# Patient Record
Sex: MALE | Race: BLACK OR AFRICAN AMERICAN | Employment: OTHER | URBAN - METROPOLITAN AREA
[De-identification: names, ages, dates, MRNs, and addresses within clinical notes are randomized per-mention and may not be internally consistent; named-entity substitution may affect disease eponyms.]

---

## 2017-03-13 ENCOUNTER — CLINICAL SUPPORT (OUTPATIENT)
Dept: CARDIOLOGY CLINIC | Age: 77
End: 2017-03-13

## 2017-03-13 DIAGNOSIS — Z95.810 ICD (IMPLANTABLE CARDIOVERTER-DEFIBRILLATOR), DUAL, IN SITU: Primary | ICD-10-CM

## 2017-03-13 NOTE — PROGRESS NOTES
Last Office Visit date : 11/21/16  Next Office visit date : 11/6/17  Last remote check date : 12/12/16  scanned in our chart : yes

## 2017-03-13 NOTE — PATIENT INSTRUCTIONS
please get remote checks for pacer or ICD every 3 months and Office check in 1 yr  Please call our office after every transmission to confirm success of transmission

## 2017-04-04 ENCOUNTER — OFFICE VISIT (OUTPATIENT)
Dept: VASCULAR SURGERY | Age: 77
End: 2017-04-04

## 2017-04-04 DIAGNOSIS — I70.202 FEMORAL ARTERY OCCLUSION, LEFT (HCC): ICD-10-CM

## 2017-04-04 DIAGNOSIS — I70.212 ATHEROSCLEROSIS OF NATIVE ARTERY OF LEFT LOWER EXTREMITY WITH INTERMITTENT CLAUDICATION (HCC): ICD-10-CM

## 2017-04-04 NOTE — PROCEDURES
[facility]   *** FINAL REPORT ***    Name: Feliz Corrales  MRN: [id number]       Outpatient  : 15 Aug 1940  HIS Order #: [id number]  [id number] [facility] Visit #: [id number]  Date: 2017    TYPE OF TEST: Extremity Arterial Duplex    REASON FOR TEST  Peripheral vascular dz NOS, Follow up revascularization                            Right                     Left  Artery               PSV   Finding             PSV   Finding  ------------------  -----  ---------------    -----  ---------------  External iliac:  Common femoral:                               134.0  Profunda femoris:                             311.0  >50% stenosis  Proximal SFA:                            282.0  Mid SFA:                                 163.0  Mild stenosis  Distal SFA:                              145.0  Popliteal:                                     81.0  Anterior tibial:  Posterior tibial:    Pressures               Right     Left               -----     -----     Brachial:   178       181           DP:   170       174           PT:   169       168            JHONATAN:  0.94      0.96            Toe: INTERPRETATION/FINDINGS  Duplex images were obtained using 2-D gray scale, color flow and  spectral doppler analysis. Left leg :  1. Diffuse atherosclerotic plaquing in the comon femoral, superficial  femoral and popliteal arteries. <50% stenosis by criteria. 2. Diffuse plaquing in the origin of the Profunda artery with elevated   velocities suggestive of >50% stenosis. 3. Multiphasic doppler signals noted throughout. 4. Mild arterial disease on the right and normal perfusion s/p revasc  on the left. The right ankle/brachial index is 0.94 and the left  ankle/brachial index is 0.96. Compared to the prior exam, there is an increase in the PSV at the  proximal to mid SFA level and in the PFA. ADDITIONAL COMMENTS    I have personally reviewed the data relevant to the interpretation of  this  study.     TECHNOLOGIST: Eren Khan, MOHINI  Signed: 04/04/2017 10:05 AM    PHYSICIAN: Karel Luque D.O.   Signed: 04/04/2017 01:10 PM

## 2017-04-04 NOTE — PROCEDURES
Tim Diamond   *** FINAL REPORT ***    Name: Des Ruiz  MRN: [id number]       Outpatient  : 15 Aug 1940  HIS Order #: [id number]  [id number] [facility] Visit #: [id number]  Date: 2017    TYPE OF TEST: Peripheral Arterial Testing    REASON FOR TEST  Peripheral vascular dz NOS, Follow-up limb revasc    Right Leg  Segmentals: Abnormal                     mmHg  Brachial         178  High thigh  Low thigh  Calf  Posterior tibial 169  Dorsalis pedis   170  Peroneal  Metatarsal  Toe pressure  Doppler:    Normal  Ankle/Brachial: 0.94    Left Leg  Segmentals: Normal                     mmHg  Brachial         181  High thigh  Low thigh  Calf  Posterior tibial 168  Dorsalis pedis   174  Peroneal  Metatarsal  Toe pressure  Doppler:    Normal  Ankle/Brachial: 0.96    INTERPRETATION/FINDINGS  Physiologic testing was performed using continuous wave doppler and  segmental pressures. JHONATAN only:  1. Mild peripheral arterial disease indicated at rest in the right  leg. 2. No evidence of significant peripheral arterial disease at rest in  the left leg. 3. The right ankle/brachial index is 0.94 and the left ankle/brachial  index is 0.96. Compared to 16 exam, there is a slight change in JHONATAN from 0.95 to   0.94 on the right, which places disease in mild range, left unchanged   at rest.    ADDITIONAL COMMENTS    I have personally reviewed the data relevant to the interpretation of  this  study. TECHNOLOGIST: Mary Colmenares RDMS  Signed: 2017 09:55 AM    PHYSICIAN: LONI Marte   Signed: 2017 01:09 PM

## 2017-04-27 ENCOUNTER — OFFICE VISIT (OUTPATIENT)
Dept: VASCULAR SURGERY | Age: 77
End: 2017-04-27

## 2017-04-27 VITALS
BODY MASS INDEX: 25.05 KG/M2 | HEART RATE: 60 BPM | SYSTOLIC BLOOD PRESSURE: 140 MMHG | HEIGHT: 70 IN | RESPIRATION RATE: 16 BRPM | DIASTOLIC BLOOD PRESSURE: 72 MMHG | WEIGHT: 175 LBS

## 2017-04-27 DIAGNOSIS — I70.213 ATHEROSCLEROSIS OF NATIVE ARTERY OF BOTH LOWER EXTREMITIES WITH INTERMITTENT CLAUDICATION (HCC): Primary | ICD-10-CM

## 2017-04-27 DIAGNOSIS — I70.202 FEMORAL ARTERY STENOSIS, LEFT (HCC): ICD-10-CM

## 2017-04-27 NOTE — MR AVS SNAPSHOT
Visit Information Date & Time Provider Department Dept. Phone Encounter #  
 4/27/2017 10:15 AM Izaiah Weaver, [address] [facility] [id number] Follow-up Instructions Return in about 1 year (around 4/27/2018). Your Appointments 11/6/2017  9:30 AM  
PROCEDURE with Miguel Avilez MD  
[facility]) Appt Note: 1 yr with pacer ck post labs [address] [facility], [address] [place] [id number]  
[facility], [address] [place] [address] Road [facility]  
  
    
 4/27/2018 10:00 AM  
PROCEDURE with [facility] NONIMAGING [facility] ([facility] Appt Note: LEG ART WENDY W/TM [address], [place] [address] [address] [facility] [address]  
[phone] [address]  
  
    
 5/10/2018  9:45 AM  
Follow Up with [facility] ([facility]) Appt Note: 1 YEAR FU AFTER STUDY AT OUR LAB  
 [address] [facility] [address]  
[phone] 2300 [facility] Kishanmarcella RussellTam [address] [facility] [address] Upcoming Health Maintenance Date Due  
 FOOT EXAM Q1 8/15/1950 EYE EXAM RETINAL OR DILATED Q1 8/15/1950 DTaP/Tdap/Td series (1 - Tdap) 8/15/1961 ZOSTER VACCINE AGE 60> 8/15/2000 GLAUCOMA SCREENING Q2Y 8/15/2005 Pneumococcal 65+ Low/Medium Risk (1 of 2 - PCV13) 8/15/2005 MEDICARE YEARLY EXAM 8/15/2005 HEMOGLOBIN A1C Q6M 6/4/2015 MICROALBUMIN Q1 12/4/2015 INFLUENZA AGE 9 TO ADULT 8/1/2016 LIPID PANEL Q1 11/29/2017 Allergies as of 4/27/2017  Review Complete On: 4/27/2017 By: Ting Blackwood LPN Severity Noted Reaction Type Reactions Fish Oil [Docosahexanoic Acid-epa]  02/04/2014    Rash, Itching Current Immunizations  Never Reviewed No immunizations on file. Not reviewed this visit You Were Diagnosed With   
  
 Codes Comments Atherosclerosis of native artery of both lower extremities with intermittent claudication ([facility]    -  Primary ICD-10-CM: T32.981 ICD-9-CM: 440.21 Vitals BP Pulse Resp Height(growth percentile) Weight(growth percentile) BMI  
 140/72 (BP 1 Location: Left arm, BP Patient Position: Sitting) 60 16 5' 10\" (1.778 m) 175 lb (79.4 kg) 25.11 kg/m2 Smoking Status Former Smoker Vitals History BMI and BSA Data Body Mass Index Body Surface Area  
 25.11 kg/m 2 1.98 m 2 Preferred Pharmacy Pharmacy Name Phone [address], [facility] [phone] Your Updated Medication List  
  
   
This list is accurate as of: 4/27/17 11:00 AM.  Always use your most recent med list.  
  
  
  
  
 ACTOS 30 mg tablet Generic drug:  pioglitazone Take 30 mg by mouth daily. aspirin delayed-release 81 mg tablet Take 1 Tab by mouth daily. atorvastatin 20 mg tablet Commonly known as:  LIPITOR Take 20 mg by mouth daily. carvedilol 25 mg tablet Commonly known as:  Johnnette  Take 25 mg by mouth two (2) times daily (with meals). clopidogrel 75 mg Tab Commonly known as:  PLAVIX Take 1 Tab by mouth daily. escitalopram oxalate 10 mg tablet Commonly known as:  Maudie Phoenix Take 10 mg by mouth daily as needed. EXFORGE 5-320 mg per tablet Generic drug:  amLODIPine-valsartan Take 1 Tab by mouth daily. glimepiride 2 mg tablet Commonly known as:  AMARYL Take 2 mg by mouth two (2) times a day. HYDROcodone-acetaminophen  mg tablet Commonly known as:  Maryfrances Grumbles Take 1 Tab by mouth every eight (8) hours as needed for Pain. Max Daily Amount: 3 Tabs. JANUVIA 100 mg tablet Generic drug:  SITagliptin Take 100 mg by mouth daily. LUNESTA 3 mg tablet Generic drug:  eszopiclone Take 3 mg by mouth nightly as needed. multivitamin tablet Commonly known as:  ONE A DAY Take 1 Tab by mouth daily. TRAVATAN Z 0.004 % ophthalmic solution Generic drug:  travoprost  
Administer 1 Drop to both eyes every evening. VOLTAREN 1 % Gel Generic drug:  diclofenac  
four (4) times daily as needed. Follow-up Instructions Return in about 1 year (around 4/27/2018). To-Do List   
 04/27/2018 Imaging:  LOWER EXT ART PVR W EXERC BILAT (TREADMILL/WALKING) AMB Please provide this summary of care documentation to your next provider. Your primary care clinician is listed as MARYAM PERRY. If you have any questions after today's visit, please call [phone].

## 2017-04-27 NOTE — PROGRESS NOTES
[facility]    Chief Complaint   Patient presents with    Leg Pain       History and Physical    Mr Cheryl Maradiaga is here for follow up after he had left leg SFA atherectomy and balloon angioplasty nearly a year and a half ago for claudication symptoms of the left leg. Had been primarily described as pain that would occur just with walking, in the left groin but with radiation to the thigh and also in the calf. After his initial procedure he had good resolution of symptoms. We saw him last fall he did start describing some intermittent familiar symptoms. But he felt it was not as severe as it had been prior to his procedure, so we agreed to just do surveillance. He has continued on aspirin and Plavix. He is still aware of the quality of the symptoms, but again no progression to level of severity as before his procedure. Past Medical History:   Diagnosis Date    Aortic valve disorders     2/10 mild AI    Automatic implantable cardiac defibrillator in situ     Needs remote check ASAP    Bone pain     CAD (coronary artery disease) 11/3/09    Cancer ([address]     prostate    DDD (degenerative disc disease), lumbosacral     L5-S1    Diabetes mellitus ([address]     Elevated prostate specific antigen (PSA) 3/10/08    Erectile dysfunction     Essential hypertension     H/O prostate cancer     Hypertension     Hypertrophic cardiomyopathy ([address]     Improved LVH    Impotence of organic origin 3/10/08    Lower urinary tract symptoms (LUTS)     Nicotine addiction 11/3/09    Other and unspecified hyperlipidemia     PAD (peripheral artery disease) ([address] 11/13/2015    add asa pending angio     Prostate cancer [facility])      s/p CRYO May 2011.   TRUS bx showed T1a [place] 4+3, 60% one core    Skin ulcer ([address]     SOBOE (shortness of breath on exertion)     Spinal stenosis     Stomach ulcer     Type II or unspecified type diabetes mellitus without mention of complication, not stated as uncontrolled     Controlled per wife    Unspecified sleep apnea     has CPAP    Wears glasses      Patient Active Problem List   Diagnosis Code    Nicotine addiction F17.200    CAD (coronary artery disease) I25.10    Elevated prostate specific antigen (PSA) R97.20    Impotence of organic origin N52.9    Diabetes mellitus ([facility] E11.9    H/O prostate cancer Z85.46    ED (erectile dysfunction) N52.9    Essential hypertension, benign I10    Hypertrophic cardiomyopathy ([facility] I42.2    Hyperlipemia E78.5    ICD (implantable cardioverter-defibrillator), dual, in situ Z95.810    Aortic valve disorders I35.9    Type II or unspecified type diabetes mellitus without mention of complication, not stated as uncontrolled E11.9    Pre-operative cardiovascular examination Z01.810    Coronary atherosclerosis of native coronary artery I25.10    Spinal stenosis M48.00    DDD (degenerative disc disease), lumbosacral M51.37    Arthritis of right knee M17.11    PAD (peripheral artery disease) ([facility] I73.9    Lumbar spinal stenosis M48.06    Facet arthritis of lumbar region ([facility] M46.96    Chronic pain G89.29     Past Surgical History:   Procedure Laterality Date    HX CARPAL TUNNEL RELEASE      HX ENDOSCOPY      HX IMPLANTABLE CARDIOVERTER DEFIBRILLATOR      HX KNEE REPLACEMENT  8/98    HX KNEE REPLACEMENT Right 08/06/2013    [facility], total knee replacement     HX KNEE REPLACEMENT Left 1998    HX KNEE REPLACEMENT Left 2014    [facility]    HX KNEE REPLACEMENT Right 2014    HX ORTHOPAEDIC      HX PACEMAKER      HX PROSTATECTOMY  6/30/11    HX UROLOGICAL      [facility]. Dr. Sangeeta Pelayo, Cryo     AL COLONOSCOPY FLX DX W/COLLJ [facility] INPATIENT REHABILITATION WHEN PFRMD       Current Outpatient Prescriptions   Medication Sig Dispense Refill    clopidogrel (PLAVIX) 75 mg tablet Take 1 Tab by mouth daily. 90 Tab 4    aspirin delayed-release 81 mg tablet Take 1 Tab by mouth daily.  100 Tab 3    HYDROcodone-acetaminophen (NORCO)  mg tablet Take 1 Tab by mouth every eight (8) hours as needed for Pain. Max Daily Amount: 3 Tabs. 90 Tab 0    VOLTAREN 1 % topical gel four (4) times daily as needed.  escitalopram oxalate (LEXAPRO) 10 mg tablet Take 10 mg by mouth daily as needed.  glimepiride (AMARYL) 2 mg tablet Take 2 mg by mouth two (2) times a day.  pioglitazone (ACTOS) 30 mg tablet Take 30 mg by mouth daily.  amLODIPine-valsartan (EXFORGE) 5-320 mg per tablet Take 1 Tab by mouth daily.  sitaGLIPtin (JANUVIA) 100 mg tablet Take 100 mg by mouth daily.  travoprost (TRAVATAN Z) 0.004 % ophthalmic solution Administer 1 Drop to both eyes every evening.  eszopiclone (LUNESTA) 3 mg tablet Take 3 mg by mouth nightly as needed.  multivitamin (ONE A DAY) tablet Take 1 Tab by mouth daily.  atorvastatin (LIPITOR) 20 mg tablet Take 20 mg by mouth daily.  carvedilol (COREG) 25 mg tablet Take 25 mg by mouth two (2) times daily (with meals). Allergies   Allergen Reactions    Fish Oil [Docosahexanoic Acid-Epa] Rash and Itching       Review of Systems    Review of Systems - History obtained from the patient  General ROS: negative  Psychological ROS: negative  Ophthalmic ROS: positive for - uses glasses  Respiratory ROS: negative  Cardiovascular ROS: negative  Gastrointestinal ROS: negative  Musculoskeletal ROS: knee pain, left groin pain  Neurological ROS: no TIA or stroke symptoms  Dermatological ROS: negative  Vascular ROS: mild left leg claudication    Physical   Visit Vitals    /72 (BP 1 Location: Left arm, BP Patient Position: Sitting)    Pulse 60    Resp 16    Ht 5' 10\" (1.778 m)    Wt 175 lb (79.4 kg)    BMI 25.11 kg/m2     General:  Alert, cooperative, no distress. Head:  Normocephalic, without obvious abnormality, atraumatic. Eyes:     Conjunctivae/corneas clear. Pupils equal, round, reactive to light. Extraocular movements intact. Neck:  No bruits   Lungs:  Clear to auscultation bilaterally.    Heart:  Regular rate and rhythm, S1, S2 normal   Extremities: Extremities normal, atraumatic, no cyanosis or edema. Pulses: Distal pulses nonpalpable but no ischemic changes. Skin: Skin color, texture, turgor normal. No rashes or lesions.          Vascular studies:  Left leg :  1. Diffuse atherosclerotic plaquing in the comon femoral, superficial  femoral and popliteal arteries. <50% stenosis by criteria. 2. Diffuse plaquing in the origin of the Profunda artery with elevated  velocities suggestive of >50% stenosis. 3. Multiphasic doppler signals noted throughout. 4. Mild arterial disease on the right and normal perfusion s/p revasc  on the left. The right ankle/brachial index is 0.94 and the left  ankle/brachial index is 0.96. Compared to the prior exam, there is an increase in the PSV at the  proximal to mid SFA level and in the PFA. Impression/Plan:     ICD-10-CM ICD-9-CM    1. Atherosclerosis of native artery of both lower extremities with intermittent claudication (HCC) I70.213 440.21 LOWER EXT ART PVR W EXERC BILAT (TREADMILL/WALKING) AMB     Orders Placed This Encounter    LOWER EXT ART PVR W EXERC BILAT (TREADMILL/WALKING) AMB     I reviewed and explained his results, as it correlates to his symptoms. He still feels at this time there is not enough severity to warrant having to do any repeat intervention. But he does feel like he could easily recognized the symptom quality that if he felt he needed to come in sooner he would give us a call. He has musculoskeletal issues too, but does feel he can distinguish those from what these vascular complaints were. He is otherwise on good risk factor control. With stability of the studies overall though I said we can just transition to yearly follow-up with him being reliable enough to call with any changes. We did do a carotid last year which was stable with mild disease, which had also been stable from a study in 2010.   We only need repeat this every few years.      Follow-up Disposition:  Return in about 1 year (around 4/27/2018). NAVID Mejía    Portions of this note have been entered using voice recognition software.

## 2017-10-17 PROBLEM — K31.A0 INTESTINAL METAPLASIA OF GASTRIC MUCOSA: Status: ACTIVE | Noted: 2017-10-17

## 2017-11-06 ENCOUNTER — CLINICAL SUPPORT (OUTPATIENT)
Dept: CARDIOLOGY CLINIC | Age: 77
End: 2017-11-06

## 2017-11-06 VITALS
DIASTOLIC BLOOD PRESSURE: 77 MMHG | HEART RATE: 53 BPM | HEIGHT: 71 IN | BODY MASS INDEX: 24.36 KG/M2 | SYSTOLIC BLOOD PRESSURE: 168 MMHG | WEIGHT: 174 LBS

## 2017-11-06 DIAGNOSIS — I10 ESSENTIAL HYPERTENSION, BENIGN: ICD-10-CM

## 2017-11-06 DIAGNOSIS — I35.1 NONRHEUMATIC AORTIC VALVE INSUFFICIENCY: ICD-10-CM

## 2017-11-06 DIAGNOSIS — E78.00 PURE HYPERCHOLESTEROLEMIA: ICD-10-CM

## 2017-11-06 DIAGNOSIS — E11.9 TYPE 2 DIABETES MELLITUS WITHOUT COMPLICATION, WITHOUT LONG-TERM CURRENT USE OF INSULIN (HCC): ICD-10-CM

## 2017-11-06 DIAGNOSIS — I42.2 HYPERTROPHIC CARDIOMYOPATHY (HCC): Primary | ICD-10-CM

## 2017-11-06 DIAGNOSIS — I25.10 ATHEROSCLEROSIS OF NATIVE CORONARY ARTERY OF NATIVE HEART WITHOUT ANGINA PECTORIS: ICD-10-CM

## 2017-11-06 DIAGNOSIS — Z95.810 ICD (IMPLANTABLE CARDIOVERTER-DEFIBRILLATOR), DUAL, IN SITU: ICD-10-CM

## 2017-11-06 NOTE — MR AVS SNAPSHOT
Visit Information Date & Time Provider Department Dept. Phone Encounter #  
 11/6/2017  9:30 AM Manuel Mackay MD [facility] [address] [place] [address] [id number] Follow-up Instructions Return in about 1 year (around 11/6/2018), or if symptoms worsen or fail to improve. Your Appointments 4/27/2018 10:00 AM  
PROCEDURE with [facility] NONIMAGING [facility] ([address]) Appt Note: LEG ART WENDY W/TM [address], [facility] 139 [address] [facility] [address]  
[phone] 2300 [facility] Haroon F[address]  
  
    
 5/10/2018  9:45 AM  
Follow Up with [facility] ([address]) Appt Note: 1 YEAR FU AFTER STUDY AT OUR LAB  
 [address], [facility] 022 200 [facility] [address]  
[phone] 2300 [facility] 200 [facility] [address] Upcoming Health Maintenance Date Due  
 FOOT EXAM Q1 8/15/1950 EYE EXAM RETINAL OR DILATED Q1 8/15/1950 DTaP/Tdap/Td series (1 - Tdap) 8/15/1961 ZOSTER VACCINE AGE 60> 6/15/2000 GLAUCOMA SCREENING Q2Y 8/15/2005 Pneumococcal 65+ Low/Medium Risk (1 of 2 - PCV13) 8/15/2005 MEDICARE YEARLY EXAM 8/15/2005 HEMOGLOBIN A1C Q6M 6/4/2015 MICROALBUMIN Q1 12/4/2015 INFLUENZA AGE 9 TO ADULT 8/1/2017 LIPID PANEL Q1 11/29/2017 Allergies as of 11/6/2017  Review Complete On: 11/6/2017 By: Manuel Mackay MD  
  
 Severity Noted Reaction Type Reactions Fish Oil [Docosahexanoic Acid-epa]  02/04/2014    Rash, Itching Current Immunizations  Never Reviewed No immunizations on file. Not reviewed this visit You Were Diagnosed With   
  
 Codes Comments Hypertrophic cardiomyopathy ([address]    -  Primary ICD-10-CM: I42.2 ICD-9-CM: 425.18 Improved LVH since HTN treated  Essential hypertension, benign     ICD-10-CM: I10 
 ICD-9-CM: 401.1 11/17 high, no meds so far today; advised to take exforge early am daily; Pure hypercholesterolemia     ICD-10-CM: E78.00 ICD-9-CM: 272.0 12/14; 8/14 Low density lipoproteins (LDLs) are at goal, triglycerides are at goal, High density lipoproteins (HDLs) are at goal. 
get from PCP Atherosclerosis of native coronary artery of native heart without angina pectoris     ICD-10-CM: I25.10 ICD-9-CM: 414.01 11/17 not on asa per GI; likely does not have CAD Type 2 diabetes mellitus without complication, without long-term current use of insulin (HCC)     ICD-10-CM: E11.9 ICD-9-CM: 250.00 F/u PCP Nonrheumatic aortic valve insufficiency     ICD-10-CM: I35.1 ICD-9-CM: 424.1 2/10; 1/14 mild AI 
  
 ICD (implantable cardioverter-defibrillator), dual, in situ     ICD-10-CM: Z95.810 ICD-9-CM: V45.02 11/17 nl function, nl vol, near ELIZABETH 
get carelink q 1month Vitals BP Pulse Height(growth percentile) Weight(growth percentile) BMI Smoking Status 168/77 (BP 1 Location: Left arm, BP Patient Position: Sitting) (!) 53 5' 11\" (1.803 m) 174 lb (78.9 kg) 24.27 kg/m2 Former Smoker Vitals History BMI and BSA Data Body Mass Index Body Surface Area  
 24.27 kg/m 2 1.99 m 2 Preferred Pharmacy Pharmacy Name Phone [id number]- Hilda Sudha Paulino  [phone] Your Updated Medication List  
  
   
This list is accurate as of: 11/6/17 10:25 AM.  Always use your most recent med list.  
  
  
  
  
 ACTOS 30 mg tablet Generic drug:  pioglitazone Take 30 mg by mouth daily. atorvastatin 20 mg tablet Commonly known as:  LIPITOR Take 20 mg by mouth daily. carvedilol 25 mg tablet Commonly known as:  Adria Been Take 25 mg by mouth two (2) times daily (with meals). dorzolamide 2 % ophthalmic solution Commonly known as:  TRUSOPT Administer 2 Drops to both eyes two (2) times a day. EXFORGE 5-320 mg per tablet Generic drug:  amLODIPine-valsartan Take 1 Tab by mouth daily. glimepiride 2 mg tablet Commonly known as:  AMARYL Take 2 mg by mouth two (2) times a day. JANUVIA 100 mg tablet Generic drug:  SITagliptin Take 100 mg by mouth daily. latanoprost 0.005 % ophthalmic solution Commonly known as:  Willy Stovall Administer 1 Drop to both eyes nightly. We Performed the Following GRAM EVAL (IN PERSON) IMPLANT DEVICE,CARDVERT/DEFIB,MULT LEAD [id number] CPT(R)] IMPLANTABLE CARDIOVASULAR CELESTE SYST [id number] CPT(R)] Follow-up Instructions Return in about 1 year (around 11/6/2018), or if symptoms worsen or fail to improve. Patient Instructions   
please get remote checks for pacer or ICD every 1 months and Office check in 1 yr Please call our office after every transmission to confirm success of transmission After the recommended changes have been made in blood pressure medicines, patient advised to keep BP/HR(pulse rate) chart twice daily and bring us results in next 2 weeks or so. Patient may send the results via \"My Chart\" if desired. Please rest for 5-10 minutes before checking blood pressure High Blood Pressure: Care Instructions Your Care Instructions If your blood pressure is usually above 140/90, you have high blood pressure, or hypertension. That means the top number is 140 or higher or the bottom number is 90 or higher, or both. Despite what a lot of people think, high blood pressure usually doesn't cause headaches or make you feel dizzy or lightheaded. It usually has no symptoms. But it does increase your risk for heart attack, stroke, and kidney or eye damage. The higher your blood pressure, the more your risk increases. Your doctor will give you a goal for your blood pressure. Your goal will be based on your health and your age. An example of a goal is to keep your blood pressure below 140/90. Lifestyle changes, such as eating healthy and being active, are always important to help lower blood pressure. You might also take medicine to reach your blood pressure goal. 
Follow-up care is a key part of your treatment and safety. Be sure to make and go to all appointments, and call your doctor if you are having problems. It's also a good idea to know your test results and keep a list of the medicines you take. How can you care for yourself at home? Medical treatment · If you stop taking your medicine, your blood pressure will go back up. You may take one or more types of medicine to lower your blood pressure. Be safe with medicines. Take your medicine exactly as prescribed. Call your doctor if you think you are having a problem with your medicine. · Talk to your doctor before you start taking aspirin every day. Aspirin can help certain people lower their risk of a heart attack or stroke. But taking aspirin isn't right for everyone, because it can cause serious bleeding. · See your doctor regularly. You may need to see the doctor more often at first or until your blood pressure comes down. · If you are taking blood pressure medicine, talk to your doctor before you take decongestants or anti-inflammatory medicine, such as ibuprofen. Some of these medicines can raise blood pressure. · Learn how to check your blood pressure at home. Lifestyle changes · Stay at a healthy weight. This is especially important if you put on weight around the waist. Losing even 10 pounds can help you lower your blood pressure. · If your doctor recommends it, get more exercise. Walking is a good choice. Bit by bit, increase the amount you walk every day. Try for at least 30 minutes on most days of the week. You also may want to swim, bike, or do other activities. · Avoid or limit alcohol. Talk to your doctor about whether you can drink any alcohol.  
· Try to limit how much sodium you eat to less than 2,300 milligrams (mg) a day. Your doctor may ask you to try to eat less than 1,500 mg a day. · Eat plenty of fruits (such as bananas and oranges), vegetables, legumes, whole grains, and low-fat dairy products. · Lower the amount of saturated fat in your diet. Saturated fat is found in animal products such as milk, cheese, and meat. Limiting these foods may help you lose weight and also lower your risk for heart disease. · Do not smoke. Smoking increases your risk for heart attack and stroke. If you need help quitting, talk to your doctor about stop-smoking programs and medicines. These can increase your chances of quitting for good. When should you call for help? Call 911 anytime you think you may need emergency care. This may mean having symptoms that suggest that your blood pressure is causing a serious heart or blood vessel problem. Your blood pressure may be over 180/110. ? For example, call 911 if: 
? · You have symptoms of a heart attack. These may include: ¨ Chest pain or pressure, or a strange feeling in the chest. 
¨ Sweating. ¨ Shortness of breath. ¨ Nausea or vomiting. ¨ Pain, pressure, or a strange feeling in the back, neck, jaw, or upper belly or in one or both shoulders or arms. ¨ Lightheadedness or sudden weakness. ¨ A fast or irregular heartbeat. ? · You have symptoms of a stroke. These may include: 
¨ Sudden numbness, tingling, weakness, or loss of movement in your face, arm, or leg, especially on only one side of your body. ¨ Sudden vision changes. ¨ Sudden trouble speaking. ¨ Sudden confusion or trouble understanding simple statements. ¨ Sudden problems with walking or balance. ¨ A sudden, severe headache that is different from past headaches. ? · You have severe back or belly pain. ?Do not wait until your blood pressure comes down on its own. Get help right away. ?Call your doctor now or seek immediate care if: 
? · Your blood pressure is much higher than normal (such as 180/110 or higher), but you don't have symptoms. ? · You think high blood pressure is causing symptoms, such as: ¨ Severe headache. ¨ Blurry vision. ? Watch closely for changes in your health, and be sure to contact your doctor if: 
? · Your blood pressure measures 140/90 or higher at least 2 times. That means the top number is 140 or higher or the bottom number is 90 or higher, or both. ? · You think you may be having side effects from your blood pressure medicine. ? · Your blood pressure is usually normal, but it goes above normal at least 2 times. Where can you learn more? Go to http://rachel-ulisses.info/. Enter R938 in the search box to learn more about \"High Blood Pressure: Care Instructions. \" Current as of: September 21, 2016 Content Version: 11.4 © 7983-8531 Healthwise, Incorporated. Care instructions adapted under license by [employer] (which disclaims liability or warranty for this information). If you have questions about a medical condition or this instruction, always ask your healthcare professional. [place] [address] any warranty or liability for your use of this information. Introducing hospitals & HEALTH SERVICES! [facility] introduces Campus Shift patient portal. Now you can access parts of your medical record, email your doctor's office, and request medication refills online. 1. In your internet browser, go to [email or web address] 2. Click on the First Time User? Click Here link in the Sign In box. You will see the New Member Sign Up page. 3. Enter your Campus [facility] Access Code exactly as it appears below. You will not need to use this code after youve completed the sign-up process. If you do not sign up before the expiration date, you must request a new code. · Campus Shift Access Code: [email or web address] Expires: 1/9/2018 12:20 PM 
 
4.  Enter the last four digits of your Social Security Number (xxxx) and Date of Birth (mm/dd/yyyy) as indicated and click Submit. You will be taken to the next sign-up page. 5. Create a exozet ID. This will be your exozet login ID and cannot be changed, so think of one that is secure and easy to remember. 6. Create a exozet password. You can change your password at any time. 7. Enter your Password Reset Question and Answer. This can be used at a later time if you forget your password. 8. Enter your e-mail address. You will receive e-mail notification when new information is available in [address] 9. Click Sign Up. You can now view and download portions of your medical record. 10. Click the Download Summary menu link to download a portable copy of your medical information. If you have questions, please visit the Frequently Asked Questions section of the exozet website. Remember, exozet is NOT to be used for urgent needs. For medical emergencies, dial 911. Now available from your iPhone and Android! Please provide this summary of care documentation to your next provider. Your primary care clinician is listed as Kaitlin Arana. If you have any questions after today's visit, please call [phone].

## 2017-11-06 NOTE — LETTER
[facility] 
5/97/[id number] 
 
11/6/2017 Dear Juan Diego Renee MD 
 
I had the pleasure of evaluating  Mr. Maira Parham in office today. Below are the relevant portions of my assessment and plan of care. ICD-10-CM ICD-9-CM 1. Hypertrophic cardiomyopathy ([facility] I42.2 425.18 Improved LVH since HTN treated 2. Essential hypertension, benign I10 401.1   
 11/17 high, no meds so far today; advised to take exforge early am daily; 3. Pure hypercholesterolemia E78.00 272.0   
 12/14; 8/14 Low density lipoproteins (LDLs) are at goal, triglycerides are at goal, High density lipoproteins (HDLs) are at goal. 
get from PCP 4. Atherosclerosis of native coronary artery of native heart without angina pectoris I25.10 414.01   
 11/17 not on asa per GI; likely does not have CAD 5. Type 2 diabetes mellitus without complication, without long-term current use of insulin ([facility]) E11.9 250.00 F/u PCP 6. Nonrheumatic aortic valve insufficiency I35.1 424.1 2/10; 1/14 mild AI 
  
7. ICD (implantable cardioverter-defibrillator), dual, in situ Z95.810 V45.02 GRAM EVAL (IN PERSON) IMPLANT DEVICE,CARDVERT/DEFIB,MULT LEAD IMPLANTABLE CARDIOVASULAR CELESTE SYST  
 11/17 nl function, nl vol, near ELIZABETH 
get carelink q 1month Current Outpatient Prescriptions Medication Sig Dispense Refill  pioglitazone (ACTOS) 30 mg tablet Take 30 mg by mouth daily.  amLODIPine-valsartan (EXFORGE) 5-320 mg per tablet Take 1 Tab by mouth daily.  dorzolamide (TRUSOPT) 2 % ophthalmic solution Administer 2 Drops to both eyes two (2) times a day.  latanoprost (XALATAN) 0.005 % ophthalmic solution Administer 1 Drop to both eyes nightly.  glimepiride (AMARYL) 2 mg tablet Take 2 mg by mouth two (2) times a day.  sitaGLIPtin (JANUVIA) 100 mg tablet Take 100 mg by mouth daily.  atorvastatin (LIPITOR) 20 mg tablet Take 20 mg by mouth daily.  carvedilol (COREG) 25 mg tablet Take 25 mg by mouth two (2) times daily (with meals). Orders Placed This Encounter  IMPLANTABLE CARDIOVASULAR CELESTE SYST  
 GRAM EVAL (IN PERSON) IMPLANT DEVICE,CARDVERT/DEFIB,MULT LEAD Order Specific Question:   Reason for Exam: Answer:   icd f/u If you have questions, please do not hesitate to call me. I look forward to following Mr. Hills along with you. Sincerely, Judith Anne MD

## 2017-11-06 NOTE — PROGRESS NOTES
HISTORY OF PRESENT ILLNESS  Melany Shaffer is a 68 y.o. male. HPI Comments: Patient denies significant chest pain, SOB, palpitations, edema, dizziness  ICD box ok  Left leg claudication- s/p percut revasc early 2016 Dr Casimiro Crow   The history is provided by the medical records (mr/ai). Pertinent negatives include no chest pain, no headaches and no shortness of breath. Hypertension   The history is provided by the medical records. This is a chronic problem. Pertinent negatives include no chest pain, no headaches and no shortness of breath. Cardiomyopathy   The history is provided by the medical records (improved LVH). Pertinent negatives include no chest pain, no headaches and no shortness of breath. Pacemaker Check   The history is provided by the medical records and patient. Pertinent negatives include no chest pain, no headaches and no shortness of breath. Review of Systems   Constitutional: Negative for chills, fever, malaise/fatigue and weight loss. HENT: Negative for nosebleeds. Eyes: Negative for discharge. Respiratory: Negative for cough, shortness of breath and wheezing. Cardiovascular: Negative for chest pain, palpitations, orthopnea, claudication, leg swelling and PND. Gastrointestinal: Negative for diarrhea, nausea and vomiting. Genitourinary: Negative for dysuria and hematuria. Musculoskeletal: Negative for joint pain. Skin: Negative for rash. Neurological: Negative for dizziness, seizures, loss of consciousness and headaches. Endo/Heme/Allergies: Negative for polydipsia. Does not bruise/bleed easily. Psychiatric/Behavioral: Negative for depression and substance abuse. The patient does not have insomnia.       Allergies   Allergen Reactions    Fish Oil [Docosahexanoic Acid-Epa] Rash and Itching       Past Medical History:   Diagnosis Date    Aortic valve disorders     2/10 mild AI    Automatic implantable cardiac defibrillator in situ Needs remote check ASAP    Bone pain     CAD (coronary artery disease) 11/3/09    [facility]     prostate    DDD (degenerative disc disease), lumbosacral     L5-S1    Diabetes mellitus ([facility]     Elevated prostate specific antigen (PSA) 3/10/08    Erectile dysfunction     Essential hypertension     H/O prostate cancer     Hypertension     Hypertrophic cardiomyopathy ([facility]     Improved LVH    Impotence of organic origin 3/10/08    Lower urinary tract symptoms (LUTS)     Nicotine addiction 11/3/09    Other and unspecified hyperlipidemia     PAD (peripheral artery disease) ([facility] 11/13/2015    add asa pending angio     Prostate cancer [facility])      s/p CRYO May 2011. TRUS bx showed T1a [place] 4+3, 60% one core    Skin ulcer (HCC)     SOBOE (shortness of breath on exertion)     Spinal stenosis     Stomach ulcer     Type II or unspecified type diabetes mellitus without mention of complication, not stated as uncontrolled     Controlled per wife    Unspecified sleep apnea     has CPAP    Wears glasses        Family History   Problem Relation Age of Onset    Heart Disease Father     Arthritis-osteo Other     Hypertension Other     Heart Attack Neg Hx     Sudden Death Neg Hx        Social History   Substance Use Topics    Smoking status: Former Smoker     Packs/day: 0.50     Years: 50.00     Quit date: 1/8/2012    Smokeless tobacco: Never Used    Alcohol use Yes      Comment: very rare        Current Outpatient Prescriptions   Medication Sig    pioglitazone (ACTOS) 30 mg tablet Take 30 mg by mouth daily.  amLODIPine-valsartan (EXFORGE) 5-320 mg per tablet Take 1 Tab by mouth daily.  dorzolamide (TRUSOPT) 2 % ophthalmic solution Administer 2 Drops to both eyes two (2) times a day.  latanoprost (XALATAN) 0.005 % ophthalmic solution Administer 1 Drop to both eyes nightly.  glimepiride (AMARYL) 2 mg tablet Take 2 mg by mouth two (2) times a day.     sitaGLIPtin (JANUVIA) 100 mg tablet Take 100 mg by mouth daily.  atorvastatin (LIPITOR) 20 mg tablet Take 20 mg by mouth daily.  carvedilol (COREG) 25 mg tablet Take 25 mg by mouth two (2) times daily (with meals). No current facility-administered medications for this visit. Past Surgical History:   Procedure Laterality Date    HX CARPAL TUNNEL RELEASE      HX ENDOSCOPY      HX IMPLANTABLE CARDIOVERTER DEFIBRILLATOR      HX KNEE REPLACEMENT  8/98    HX KNEE REPLACEMENT Right 08/06/2013    [facility], total knee replacement     HX KNEE REPLACEMENT Left 1998    HX KNEE REPLACEMENT Left 2014    [facility]    HX KNEE REPLACEMENT Right 2014    HX ORTHOPAEDIC      HX PACEMAKER      [employer]    HX PROSTATECTOMY  6/30/11    HX UROLOGICAL      [facility]. Dr. Zoya Diaz, Cryo     MT COLONOSCOPY FLX DX W/COLLJ [facility] WHEN PFRMD         Diagnostic Studies:  CARDIOLOGY STUDIES 9/10/2014 1/10/2014 9/25/2012 2/21/2011 2/15/2010 3/11/2009 12/5/2005   ICD / Pacemaker Check Result - - (redone due to pt call with pain next to ICD site) nl function nl function incr vol per optivol - - -   Myocardial Perfusion Scan Result - - - - - - abn scan, fixed inferior wall defect, EF 52%   Pharmacological Nuclear Stress Test Result nl scan, nl EF - - - - - -   Echocardiogram - Complete Result - 65%EF, mild DD, tr MR, mild AI - - EF 65-70%, mod DD, trace MR, mild AI, PAP 48; EF 65%, mild DD, mild MR, mild AI EF 65-70%, mod DD, mild MR   Some recent data might be hidden       Visit Vitals    /77 (BP 1 Location: Left arm, BP Patient Position: Sitting)    Pulse (!) 53    Ht 5' 11\" (1.803 m)    Wt 78.9 kg (174 lb)    BMI 24.27 kg/m2       Mr. Kamar Schaefer has a reminder for a \"due or due soon\" health maintenance. I have asked that he contact his primary care provider for follow-up on this health maintenance. Physical Exam   Constitutional: He is oriented to person, place, and time. He appears well-developed and well-nourished. No distress.    HENT: Head: Normocephalic and atraumatic. Eyes: Right eye exhibits no discharge. Left eye exhibits no discharge. No scleral icterus. Neck: Neck supple. No JVD present. Carotid bruit is not present. No thyromegaly present. Cardiovascular: Normal rate, regular rhythm, S1 normal, S2 normal and intact distal pulses. Exam reveals distant heart sounds. Exam reveals no gallop and no friction rub. No murmur heard. Pulmonary/Chest: Effort normal and breath sounds normal. He has no wheezes. He has no rales. Normal position & motion of ICD box   Abdominal: Soft. He exhibits no mass. There is no tenderness. Musculoskeletal: He exhibits no edema. Neurological: He is alert and oriented to person, place, and time. Skin: Skin is warm and dry. No rash noted. Psychiatric: He has a normal mood and affect. His behavior is normal.       ASSESSMENT and PLAN          Diagnoses and all orders for this visit:    1. Hypertrophic cardiomyopathy (HCC)  Comments:  Improved LVH since HTN treated      2. Essential hypertension, benign  Comments:  11/17 high, no meds so far today; advised to take exforge early am daily;     3. Pure hypercholesterolemia  Comments:  12/14; 8/14 Low density lipoproteins (LDLs) are at goal, triglycerides are at goal, High density lipoproteins (HDLs) are at goal.  get from PCP    4. Atherosclerosis of native coronary artery of native heart without angina pectoris  Comments:  11/17 not on asa per GI; likely does not have CAD    5. Type 2 diabetes mellitus without complication, without long-term current use of insulin ([facility])  Comments:  F/u PCP      6. Nonrheumatic aortic valve insufficiency  Comments:  2/10; 1/14 mild AI      7.  ICD (implantable cardioverter-defibrillator), dual, in situ  Comments:  11/17 nl function, nl vol, near ELIZABETH  get carelink q 1month  Orders:  -     GRAM EVAL (IN PERSON) IMPLANT DEVICE,CARDVERT/DEFIB,MULT LEAD  -     IMPLANTABLE CARDIOVASULAR CELESTE SYST        Pertinent laboratory and test data reviewed and discussed with patient. See patient instructions also for other medical advice given    Medications Discontinued During This Encounter   Medication Reason    HYDROcodone-acetaminophen (NORCO)  mg tablet Therapy Completed    aspirin delayed-release 81 mg tablet Not A Current Medication       Follow-up Disposition:  Return in about 1 year (around 11/6/2018), or if symptoms worsen or fail to improve.

## 2017-11-06 NOTE — PROGRESS NOTES
1. Have you been to the ER, urgent care clinic since your last visit? Hospitalized since your last visit?     no    2. Have you seen or consulted any other health care providers outside of the [address] [place] [address] since your last visit? Include any pap smears or colon screening. Yes Gastro     3. Since your last visit, have you had any of the following symptoms? No symptoms reported by patient        4. Have you had any blood work, X-rays or cardiac testing? Dr. Gayla Tapia office approx 3 months ago blood work     5. Where do you normally have your labs drawn? [address]    6. Do you need any refills today?   no      Patient states he did not take medications this morning.

## 2017-11-06 NOTE — PATIENT INSTRUCTIONS
please get remote checks for pacer or ICD every 1 months and Office check in 1 yr  Please call our office after every transmission to confirm success of transmission    After the recommended changes have been made in blood pressure medicines, patient advised to keep BP/HR(pulse rate) chart twice daily and bring us results in next 2 weeks or so. Patient may send the results via \"My Chart\" if desired. Please rest for 5-10 minutes before checking blood pressure    Medications Discontinued During This Encounter   Medication Reason    HYDROcodone-acetaminophen (NORCO)  mg tablet Therapy Completed    aspirin delayed-release 81 mg tablet Not A Current Medication       Orders Placed This Encounter    IMPLANTABLE CARDIOVASULAR CELESTE SYST    GRAM EVAL (IN PERSON) IMPLANT DEVICE,CARDVERT/DEFIB,MULT LEAD     Order Specific Question:   Reason for Exam:     Answer:   icd f/u          High Blood Pressure: Care Instructions  Your Care Instructions    If your blood pressure is usually above 140/90, you have high blood pressure, or hypertension. That means the top number is 140 or higher or the bottom number is 90 or higher, or both. Despite what a lot of people think, high blood pressure usually doesn't cause headaches or make you feel dizzy or lightheaded. It usually has no symptoms. But it does increase your risk for heart attack, stroke, and kidney or eye damage. The higher your blood pressure, the more your risk increases. Your doctor will give you a goal for your blood pressure. Your goal will be based on your health and your age. An example of a goal is to keep your blood pressure below 140/90. Lifestyle changes, such as eating healthy and being active, are always important to help lower blood pressure. You might also take medicine to reach your blood pressure goal.  Follow-up care is a key part of your treatment and safety.  Be sure to make and go to all appointments, and call your doctor if you are having problems. It's also a good idea to know your test results and keep a list of the medicines you take. How can you care for yourself at home? Medical treatment  · If you stop taking your medicine, your blood pressure will go back up. You may take one or more types of medicine to lower your blood pressure. Be safe with medicines. Take your medicine exactly as prescribed. Call your doctor if you think you are having a problem with your medicine. · Talk to your doctor before you start taking aspirin every day. Aspirin can help certain people lower their risk of a heart attack or stroke. But taking aspirin isn't right for everyone, because it can cause serious bleeding. · See your doctor regularly. You may need to see the doctor more often at first or until your blood pressure comes down. · If you are taking blood pressure medicine, talk to your doctor before you take decongestants or anti-inflammatory medicine, such as ibuprofen. Some of these medicines can raise blood pressure. · Learn how to check your blood pressure at home. Lifestyle changes  · Stay at a healthy weight. This is especially important if you put on weight around the waist. Losing even 10 pounds can help you lower your blood pressure. · If your doctor recommends it, get more exercise. Walking is a good choice. Bit by bit, increase the amount you walk every day. Try for at least 30 minutes on most days of the week. You also may want to swim, bike, or do other activities. · Avoid or limit alcohol. Talk to your doctor about whether you can drink any alcohol. · Try to limit how much sodium you eat to less than 2,300 milligrams (mg) a day. Your doctor may ask you to try to eat less than 1,500 mg a day. · Eat plenty of fruits (such as bananas and oranges), vegetables, legumes, whole grains, and low-fat dairy products. · Lower the amount of saturated fat in your diet. Saturated fat is found in animal products such as milk, cheese, and meat.  Limiting these foods may help you lose weight and also lower your risk for heart disease. · Do not smoke. Smoking increases your risk for heart attack and stroke. If you need help quitting, talk to your doctor about stop-smoking programs and medicines. These can increase your chances of quitting for good. When should you call for help? Call 911 anytime you think you may need emergency care. This may mean having symptoms that suggest that your blood pressure is causing a serious heart or blood vessel problem. Your blood pressure may be over 180/110. ? For example, call 911 if:  ? · You have symptoms of a heart attack. These may include:  ¨ Chest pain or pressure, or a strange feeling in the chest.  ¨ Sweating. ¨ Shortness of breath. ¨ Nausea or vomiting. ¨ Pain, pressure, or a strange feeling in the back, neck, jaw, or upper belly or in one or both shoulders or arms. ¨ Lightheadedness or sudden weakness. ¨ A fast or irregular heartbeat. ? · You have symptoms of a stroke. These may include:  ¨ Sudden numbness, tingling, weakness, or loss of movement in your face, arm, or leg, especially on only one side of your body. ¨ Sudden vision changes. ¨ Sudden trouble speaking. ¨ Sudden confusion or trouble understanding simple statements. ¨ Sudden problems with walking or balance. ¨ A sudden, severe headache that is different from past headaches. ? · You have severe back or belly pain. ?Do not wait until your blood pressure comes down on its own. Get help right away. ?Call your doctor now or seek immediate care if:  ? · Your blood pressure is much higher than normal (such as 180/110 or higher), but you don't have symptoms. ? · You think high blood pressure is causing symptoms, such as:  ¨ Severe headache. ¨ Blurry vision. ? Watch closely for changes in your health, and be sure to contact your doctor if:  ? · Your blood pressure measures 140/90 or higher at least 2 times.  That means the top number is 140 or higher or the bottom number is 90 or higher, or both. ? · You think you may be having side effects from your blood pressure medicine. ? · Your blood pressure is usually normal, but it goes above normal at least 2 times. Where can you learn more? Go to http://rachel-ulisses.info/. Enter W979 in the search box to learn more about \"High Blood Pressure: Care Instructions. \"  Current as of: September 21, 2016  Content Version: 11.4  © 8591-9764 [employer]. Care instructions adapted under license by [employer] (which disclaims liability or warranty for this information). If you have questions about a medical condition or this instruction, always ask your healthcare professional. [address] any warranty or liability for your use of this information.

## 2017-11-27 DIAGNOSIS — I10 ESSENTIAL HYPERTENSION: Primary | ICD-10-CM

## 2017-11-27 RX ORDER — AMLODIPINE AND VALSARTAN 10; 320 MG/1; MG/1
1 TABLET ORAL DAILY
COMMUNITY
End: 2017-11-27 | Stop reason: SDUPTHER

## 2017-11-27 RX ORDER — AMLODIPINE AND VALSARTAN 10; 320 MG/1; MG/1
1 TABLET ORAL DAILY
Qty: 30 TAB | Refills: 6 | Status: SHIPPED | OUTPATIENT
Start: 2017-11-27 | End: 2018-02-26 | Stop reason: DRUGHIGH

## 2017-11-27 RX ORDER — AMLODIPINE AND VALSARTAN 10; 320 MG/1; MG/1
1 TABLET ORAL DAILY
Qty: 30 TAB | Refills: 5 | Status: SHIPPED | OUTPATIENT
Start: 2017-11-27 | End: 2017-12-04 | Stop reason: SDUPTHER

## 2017-12-04 DIAGNOSIS — I10 ESSENTIAL HYPERTENSION: ICD-10-CM

## 2017-12-04 RX ORDER — AMLODIPINE AND VALSARTAN 10; 320 MG/1; MG/1
1 TABLET ORAL DAILY
Qty: 90 TAB | Refills: 1 | Status: SHIPPED | OUTPATIENT
Start: 2017-12-04 | End: 2017-12-18 | Stop reason: SDUPTHER

## 2017-12-05 ENCOUNTER — TELEPHONE (OUTPATIENT)
Dept: CARDIOLOGY CLINIC | Age: 77
End: 2017-12-05

## 2017-12-05 NOTE — TELEPHONE ENCOUNTER
Per Dr Trudi Castro    Continue to monitor bp for 1 more week. Patient will call monitor for another week and then bring by the office.

## 2017-12-11 DIAGNOSIS — I70.212 ATHEROSCLEROSIS OF NATIVE ARTERY OF LEFT LOWER EXTREMITY WITH INTERMITTENT CLAUDICATION (HCC): ICD-10-CM

## 2017-12-11 DIAGNOSIS — I70.202 FEMORAL ARTERY OCCLUSION, LEFT (HCC): ICD-10-CM

## 2017-12-11 RX ORDER — CLOPIDOGREL BISULFATE 75 MG/1
TABLET ORAL
Qty: 90 TAB | Refills: 4 | Status: SHIPPED | OUTPATIENT
Start: 2017-12-11 | End: 2018-02-01

## 2017-12-18 DIAGNOSIS — I10 ESSENTIAL HYPERTENSION: Primary | ICD-10-CM

## 2017-12-18 RX ORDER — DOXAZOSIN 1 MG/1
1 TABLET ORAL
Qty: 30 TAB | Refills: 2 | Status: SHIPPED | OUTPATIENT
Start: 2017-12-18 | End: 2018-03-10 | Stop reason: SDUPTHER

## 2017-12-18 RX ORDER — DOXAZOSIN 1 MG/1
1 TABLET ORAL DAILY
COMMUNITY
End: 2017-12-18 | Stop reason: SDUPTHER

## 2017-12-18 NOTE — TELEPHONE ENCOUNTER
Due to patient bp log for the last week. SEE MEDIA for SCAN    Dr King Cons is adding Doxazosin 1 mg Take 1 tablet by mouth at bedtime    Continue to record bp/hr daily and report back in one week. This has been fully explained to the patient's wife (patient was not available) via phone call, who indicates understanding.

## 2018-02-01 ENCOUNTER — OFFICE VISIT (OUTPATIENT)
Dept: CARDIOLOGY CLINIC | Age: 78
End: 2018-02-01

## 2018-02-01 VITALS
HEIGHT: 71 IN | DIASTOLIC BLOOD PRESSURE: 72 MMHG | HEART RATE: 67 BPM | SYSTOLIC BLOOD PRESSURE: 158 MMHG | WEIGHT: 182 LBS | OXYGEN SATURATION: 98 % | BODY MASS INDEX: 25.48 KG/M2

## 2018-02-01 DIAGNOSIS — E78.00 PURE HYPERCHOLESTEROLEMIA: ICD-10-CM

## 2018-02-01 DIAGNOSIS — Z95.810 ICD (IMPLANTABLE CARDIOVERTER-DEFIBRILLATOR), DUAL, IN SITU: Primary | ICD-10-CM

## 2018-02-01 DIAGNOSIS — I25.10 ATHEROSCLEROSIS OF NATIVE CORONARY ARTERY OF NATIVE HEART WITHOUT ANGINA PECTORIS: ICD-10-CM

## 2018-02-01 DIAGNOSIS — I35.1 NONRHEUMATIC AORTIC VALVE INSUFFICIENCY: ICD-10-CM

## 2018-02-01 DIAGNOSIS — I42.9 CARDIOMYOPATHY, UNSPECIFIED TYPE (HCC): ICD-10-CM

## 2018-02-01 DIAGNOSIS — Z95.810 ICD (IMPLANTABLE CARDIOVERTER-DEFIBRILLATOR), DUAL, IN SITU: ICD-10-CM

## 2018-02-01 DIAGNOSIS — Z01.812 PRE-PROCEDURE LAB EXAM: Primary | ICD-10-CM

## 2018-02-01 DIAGNOSIS — I42.8 OTHER CARDIOMYOPATHY (HCC): ICD-10-CM

## 2018-02-01 DIAGNOSIS — I10 ESSENTIAL HYPERTENSION, BENIGN: ICD-10-CM

## 2018-02-01 DIAGNOSIS — I42.2 HYPERTROPHIC CARDIOMYOPATHY (HCC): ICD-10-CM

## 2018-02-01 NOTE — PROGRESS NOTES
History of Present Illness:  A 68 y.o. male referred by Dr. Elise Lackey for dual chamber Medtronic AICD requiring change out. He triggered ELIZABETH 11/23/17. He is not device dependent. He has not had any shocks. He had initial leads placed in 2003 and change-out mostly recently in 2009. Overall, doing well. He denies any new chest pain, dyspnea, PND, orthopnea or edema. He has noticed there has been slightly high atrial and ventricular thresholds. The right ventricular threshold is 2.5 V. There has been no documented noise. Impression/Plan:   Hypertrophic cardiomyopathy, AICD placed for primary prevention  Echo 2014 with EF 65%,moderate LVH  Dual chamber Medtronic AICD initially placed 2003, change out 2009, placed for primary prevention for cardiomyopathy. Slightly high atrial ventricular thresholds on leads. Right atrium is 2 V and the right ventricle is 2.5 V. Sensing is reasonable, impedance is stable. I suspect there is progressive fibrosis. Peripheral vascular disease. Chronic class II systolic heart failure. Diabetes. Obstructive sleep apnea. I discussed risks, benefits and alternatives to generator change out. At this time, he is not device dependent. He has not had any significant events with shocks recently. I discussed possible explantation. However with the thresholds just mildly elevated and stable, I believe the risk outweighs the benefit and I would continue to monitor. I will make arrangements for his generator change out. Of note, he woke up last time during the procedure and I will make sure anesthesia is available and he has adequate MAC.       Past Medical History:   Diagnosis Date    Aortic valve disorders     2/10 mild AI    Automatic implantable cardiac defibrillator in situ     Needs remote check ASAP    Bone pain     CAD (coronary artery disease) 11/3/09    Cancer ([address]     prostate    DDD (degenerative disc disease), lumbosacral     L5-S1    Diabetes mellitus ([address]     Elevated prostate specific antigen (PSA) 3/10/08    Erectile dysfunction     Essential hypertension     H/O prostate cancer     Hypertension     Hypertrophic cardiomyopathy (HCC)     Improved LVH    Impotence of organic origin 3/10/08    Lower urinary tract symptoms (LUTS)     Nicotine addiction 11/3/09    Other and unspecified hyperlipidemia     PAD (peripheral artery disease) ([facility] 11/13/2015    add asa pending angio     Prostate cancer [facility])      s/p CRYO May 2011. TRUS bx showed T1a [place] 4+3, 60% one core    Skin ulcer ([facility]     SOBOE (shortness of breath on exertion)     Spinal stenosis     Stomach ulcer     Type II or unspecified type diabetes mellitus without mention of complication, not stated as uncontrolled     Controlled per wife    Unspecified sleep apnea     has CPAP    Wears glasses        Current Outpatient Prescriptions   Medication Sig Dispense Refill    doxazosin (CARDURA) 1 mg tablet Take 1 Tab by mouth nightly. 30 Tab 2    clopidogrel (PLAVIX) 75 mg tab TAKE 1 TABLET DAILY 90 Tab 4    amLODIPine-valsartan (EXFORGE)  mg per tablet Take 1 Tab by mouth daily. 30 Tab 6    pioglitazone (ACTOS) 30 mg tablet Take 30 mg by mouth daily.  dorzolamide (TRUSOPT) 2 % ophthalmic solution Administer 2 Drops to both eyes two (2) times a day.  latanoprost (XALATAN) 0.005 % ophthalmic solution Administer 1 Drop to both eyes nightly.  glimepiride (AMARYL) 2 mg tablet Take 2 mg by mouth two (2) times a day.  sitaGLIPtin (JANUVIA) 100 mg tablet Take 100 mg by mouth daily.  atorvastatin (LIPITOR) 20 mg tablet Take 20 mg by mouth daily.  carvedilol (COREG) 25 mg tablet Take 25 mg by mouth two (2) times daily (with meals). Social History   reports that he quit smoking about 6 years ago. He has a 25.00 pack-year smoking history. He has never used smokeless tobacco.   reports that he drinks alcohol.     Family History  family history includes Arthritis-osteo in an other family member; Heart Disease in his father; Hypertension in an other family member. There is no history of Heart Attack or Sudden Death. Review of Systems  Except as stated above include:  Constitutional: Negative for fever, chills and malaise/fatigue. HEENT: No congestion or recent URI. Gastrointestinal: No nausea, vomiting, abdominal pain, bloody stools. Pulmonary:  Negative except as stated above. Cardiac:  Negative except as stated above. Musculoskeletal: Negative except as stated above. Neurological:  No localized symptoms. Skin:  Negative except as stated above. Psych:  No mood swings. Endocrine:  No heat/cold intolerance. PHYSICAL EXAM  BP Readings from Last 3 Encounters:   02/01/18 158/72   11/06/17 168/77   10/17/17 127/90     Pulse Readings from Last 3 Encounters:   02/01/18 67   11/06/17 (!) 53   10/17/17 (!) 56     Wt Readings from Last 3 Encounters:   02/01/18 82.6 kg (182 lb)   11/06/17 78.9 kg (174 lb)   10/17/17 79.1 kg (174 lb 6.1 oz)     General:   Well developed, well groomed. Head/Neck:   No jugular venous distention     No carotid bruits. No evidence of xanthelasma. Lungs:   No respiratory distress. Clear bilaterally. Heart:    Regular rate and rhythm. Normal S1/S2. Palpation of heart with normal point of maximum impulse. No significant murmurs, rubs or gallops. Left aicd intact. Abdomen:   Soft and nontender. No palpable abdominal mass or bruits. Extremities:   Intact peripheral pulses. No significant edema. Neurological:   Alert and oriented to person, place, time. No focal neurological deficit visually. Blood Pressure Metric:  Vernon Rodriguez has been given the following recommendations today due to his elevated BP reading: lifestyle modifications to include: dietary sodium restriction.

## 2018-02-01 NOTE — PROGRESS NOTES
1. Have you been to the ER, urgent care clinic since your last visit? Hospitalized since your last visit? No     2. Have you seen or consulted any other health care providers outside of the [address] [place] [address] since your last visit? Include any pap smears or colon screening.  No

## 2018-02-01 NOTE — MR AVS SNAPSHOT
[address] [address] [address] [address] [phone] Patient: Bladimir Vicente MRN: [id number] Visit Information Date & Time Provider Department Dept. Phone Encounter #  
 2/1/2018  3:45  [facility] [id number] Your Appointments 4/27/2018 10:00 AM  
PROCEDURE with BSVVS NONIMAGING [facility] ([address] [facility]) Appt Note: LEG ART WENDY W/TM [place] [address] [place] [address] [phone]  
[phone] [address]  
  
    
 5/10/2018  9:45 AM  
Follow Up with NAVID [facility] ([address] [place] [address]) Appt Note: 1 YEAR FU AFTER STUDY AT OUR LAB  
 [address] [place] [address] [phone]  
[phone] [address]  
  
    
 11/19/2018  9:30 AM  
ESTABLISHED PATIENT with Jose Grant MD  
[facility]) Appt Note: 1 yr  
 [address] [facility], [facility] [address] [facility] [address]  
[address] [facility], [address] [place] [address] Upcoming Health Maintenance Date Due  
 FOOT EXAM Q1 8/15/1950 EYE EXAM RETINAL OR DILATED Q1 8/15/1950 DTaP/Tdap/Td series (1 - Tdap) 8/15/1961 ZOSTER VACCINE AGE 60> 6/15/2000 GLAUCOMA SCREENING Q2Y 8/15/2005 Pneumococcal 65+ Low/Medium Risk (1 of 2 - PCV13) 8/15/2005 MEDICARE YEARLY EXAM 8/15/2005 HEMOGLOBIN A1C Q6M 6/4/2015 MICROALBUMIN Q1 12/4/2015 Influenza Age 5 to Adult 8/1/2017 LIPID PANEL Q1 11/29/2017 Allergies as of 2/1/2018  Review Complete On: 2/1/2018 By: John Costa MD  
  
 Severity Noted Reaction Type Reactions Fish Oil [Docosahexanoic Acid-epa]  02/04/2014    Rash, Itching Current Immunizations  Never Reviewed No immunizations on file. Not reviewed this visit You Were Diagnosed With   
  
 Codes Comments Pre-procedure lab exam    -  Primary ICD-10-CM: V02.975 ICD-9-CM: V72.63   
 ICD (implantable cardioverter-defibrillator), dual, in situ     ICD-10-CM: Z95.810 ICD-9-CM: V45.02 Cardiomyopathy, unspecified type ([address]     ICD-10-CM: I42.9 ICD-9-CM: 425.4 Other cardiomyopathy ([address]     ICD-10-CM: I42.8 ICD-9-CM: 425.4 Vitals Smoking Status Former Smoker Preferred Pharmacy Pharmacy Name Phone [id number]- [address] [phone] Your Updated Medication List  
  
   
This list is accurate as of: 2/1/18  4:10 PM.  Always use your most recent med list.  
  
  
  
  
 ACTOS 30 mg tablet Generic drug:  pioglitazone Take 30 mg by mouth daily. amLODIPine-valsartan  mg per tablet Commonly known as:  Cardenas Redo Take 1 Tab by mouth daily. atorvastatin 20 mg tablet Commonly known as:  LIPITOR Take 20 mg by mouth daily. carvedilol 25 mg tablet Commonly known as:  Naseem Si Take 25 mg by mouth two (2) times daily (with meals). clopidogrel 75 mg Tab Commonly known as:  PLAVIX TAKE 1 TABLET DAILY  
  
 dorzolamide 2 % ophthalmic solution Commonly known as:  TRUSOPT Administer 2 Drops to both eyes two (2) times a day. doxazosin 1 mg tablet Commonly known as:  CARDURA Take 1 Tab by mouth nightly. glimepiride 2 mg tablet Commonly known as:  AMARYL Take 2 mg by mouth two (2) times a day. JANUVIA 100 mg tablet Generic drug:  SITagliptin Take 100 mg by mouth daily. latanoprost 0.005 % ophthalmic solution Commonly known as:  Lorrie Dotter Administer 1 Drop to both eyes nightly. To-Do List   
 02/06/2018 Lab:  CBC WITH AUTOMATED DIFF   
  
 02/06/2018 Lab:  METABOLIC PANEL, COMPREHENSIVE   
  
 02/06/2018 Lab: PROTHROMBIN TIME + INR Patient Instructions DR. GOLDSTEIN [facility] Patient  EP Instructions 1. You are scheduled to have a AICD generator change on  2/13/18 , at 8 am.    Please check in at 7 am. 
 
2. Please go to DR. TRISTON NAVA and bijan in the outpatient parking lot that is located around to the back of the \[facility]\"" and enter through the [facility] building. Once you enter through the [facility] check in with the  there. The  will either give you directions or assist you in getting to the cath holding area. 3.         You are not to eat or drink anything after midnight the morning of the procedure. 4. Please continue to take your medications with a small sip of water on the morning of the procedure with the following exceptions:  N/A  
 
5. If you are diabetic, do not take your insulin/sugar pill the morning of the procedure. 6. We encourage families to wait in the waiting room on the first floor while the procedure is being done. The Doctor will come out and talk with you as soon as the procedure is over. 7. There is the possibility that you may spend the night in the hospital, depending on the results of the procedure. This will be determined after the procedure is done. 8.   If you or your family have any questions, please call our office Monday-Friday 9:00am  
      -4:30 pm , at [phone], and ask to speak to one of the nurses. Introducing [facility]! [employer] introduces [employer] patient portal. Now you can access parts of your medical record, email your doctor's office, and request medication refills online. 1. In your internet browser, go to [email or web address] 2. Click on the First Time User? Click Here link in the Sign In box. You will see the New Member Sign Up page. 3. Enter your [employer] Access Code exactly as it appears below.  You will not need to use this code after youve completed the sign-up process. If you do not sign up before the expiration date, you must request a new code. · [employer] Access Code: [email or web address] Expires: 5/2/2018  4:03 PM 
 
4. Enter the last four digits of your Social Security Number (xxxx) and Date of Birth (mm/dd/yyyy) as indicated and click Submit. You will be taken to the next sign-up page. 5. Create a [employer] ID. This will be your [employer] login ID and cannot be changed, so think of one that is secure and easy to remember. 6. Create a [employer] password. You can change your password at any time. 7. Enter your Password Reset Question and Answer. This can be used at a later time if you forget your password. 8. Enter your e-mail address. You will receive e-mail notification when new information is available in [address] 9. Click Sign Up. You can now view and download portions of your medical record. 10. Click the Download Summary menu link to download a portable copy of your medical information. If you have questions, please visit the Frequently Asked Questions section of the [employer] website. Remember, [employer] is NOT to be used for urgent needs. For medical emergencies, dial 911. Now available from your iPhone and Android! Please provide this summary of care documentation to your next provider. Your primary care clinician is listed as Jared Austin. If you have any questions after today's visit, please call [phone].

## 2018-02-01 NOTE — PROGRESS NOTES
Patient given written instructions at time of visit and allowed time for questions to be answered Mendel Roes, LPN

## 2018-02-06 ENCOUNTER — HOSPITAL ENCOUNTER (OUTPATIENT)
Dept: PREADMISSION TESTING | Age: 78
Discharge: HOME | End: 2018-02-06
Payer: MEDICARE

## 2018-02-06 DIAGNOSIS — I42.9 CARDIOMYOPATHY, UNSPECIFIED TYPE (HCC): ICD-10-CM

## 2018-02-06 DIAGNOSIS — I42.8 OTHER CARDIOMYOPATHY (HCC): ICD-10-CM

## 2018-02-06 DIAGNOSIS — Z95.810 ICD (IMPLANTABLE CARDIOVERTER-DEFIBRILLATOR), DUAL, IN SITU: ICD-10-CM

## 2018-02-06 DIAGNOSIS — Z01.812 PRE-PROCEDURE LAB EXAM: ICD-10-CM

## 2018-02-06 LAB
ALBUMIN SERPL-MCNC: 3.5 G/DL (ref 3.4–5)
ALBUMIN/GLOB SERPL: 1.1 {RATIO} (ref 0.8–1.7)
ALP SERPL-CCNC: 105 U/L (ref 45–117)
ALT SERPL-CCNC: 20 U/L (ref 16–61)
ANION GAP SERPL CALC-SCNC: 5 MMOL/L (ref 3–18)
AST SERPL-CCNC: 13 U/L (ref 15–37)
BASOPHILS # BLD: 0 K/UL (ref 0–0.06)
BASOPHILS NFR BLD: 0 % (ref 0–2)
BILIRUB SERPL-MCNC: 1.1 MG/DL (ref 0.2–1)
BUN SERPL-MCNC: 17 MG/DL (ref 7–18)
BUN/CREAT SERPL: 10 (ref 12–20)
CALCIUM SERPL-MCNC: 8.2 MG/DL (ref 8.5–10.1)
CHLORIDE SERPL-SCNC: 109 MMOL/L (ref 100–108)
CO2 SERPL-SCNC: 27 MMOL/L (ref 21–32)
CREAT SERPL-MCNC: 1.63 MG/DL (ref 0.6–1.3)
DIFFERENTIAL METHOD BLD: ABNORMAL
EOSINOPHIL # BLD: 0.1 K/UL (ref 0–0.4)
EOSINOPHIL NFR BLD: 3 % (ref 0–5)
ERYTHROCYTE [DISTWIDTH] IN BLOOD BY AUTOMATED COUNT: 14.4 % (ref 11.6–14.5)
GLOBULIN SER CALC-MCNC: 3.2 G/DL (ref 2–4)
GLUCOSE SERPL-MCNC: 254 MG/DL (ref 74–99)
HCT VFR BLD AUTO: 34 % (ref 36–48)
HGB BLD-MCNC: 12.3 G/DL (ref 13–16)
INR PPP: 1.1 (ref 0.8–1.2)
LYMPHOCYTES # BLD: 1.4 K/UL (ref 0.9–3.6)
LYMPHOCYTES NFR BLD: 32 % (ref 21–52)
MCH RBC QN AUTO: 31.5 PG (ref 24–34)
MCHC RBC AUTO-ENTMCNC: 36.2 G/DL (ref 31–37)
MCV RBC AUTO: 87 FL (ref 74–97)
MONOCYTES # BLD: 0.5 K/UL (ref 0.05–1.2)
MONOCYTES NFR BLD: 12 % (ref 3–10)
NEUTS SEG # BLD: 2.3 K/UL (ref 1.8–8)
NEUTS SEG NFR BLD: 53 % (ref 40–73)
PLATELET # BLD AUTO: 100 K/UL (ref 135–420)
PMV BLD AUTO: 11.9 FL (ref 9.2–11.8)
POTASSIUM SERPL-SCNC: 4.3 MMOL/L (ref 3.5–5.5)
PROT SERPL-MCNC: 6.7 G/DL (ref 6.4–8.2)
PROTHROMBIN TIME: 14 SEC (ref 11.5–15.2)
RBC # BLD AUTO: 3.91 M/UL (ref 4.7–5.5)
SODIUM SERPL-SCNC: 141 MMOL/L (ref 136–145)
WBC # BLD AUTO: 4.4 K/UL (ref 4.6–13.2)

## 2018-02-06 PROCEDURE — 85610 PROTHROMBIN TIME: CPT | Performed by: INTERNAL MEDICINE

## 2018-02-06 PROCEDURE — 36415 COLL VENOUS BLD VENIPUNCTURE: CPT | Performed by: INTERNAL MEDICINE

## 2018-02-06 PROCEDURE — 80053 COMPREHEN METABOLIC PANEL: CPT | Performed by: INTERNAL MEDICINE

## 2018-02-06 PROCEDURE — 85025 COMPLETE CBC W/AUTO DIFF WBC: CPT | Performed by: INTERNAL MEDICINE

## 2018-02-13 ENCOUNTER — HOSPITAL ENCOUNTER (OUTPATIENT)
Dept: CARDIAC CATH/INVASIVE PROCEDURES | Age: 78
Discharge: HOME | End: 2018-02-13
Attending: INTERNAL MEDICINE | Admitting: INTERNAL MEDICINE
Payer: MEDICARE

## 2018-02-13 ENCOUNTER — ANESTHESIA (OUTPATIENT)
Dept: CARDIAC CATH/INVASIVE PROCEDURES | Age: 78
End: 2018-02-13
Payer: MEDICARE

## 2018-02-13 ENCOUNTER — ANESTHESIA EVENT (OUTPATIENT)
Dept: CARDIAC CATH/INVASIVE PROCEDURES | Age: 78
End: 2018-02-13
Payer: MEDICARE

## 2018-02-13 VITALS
RESPIRATION RATE: 27 BRPM | BODY MASS INDEX: 24.77 KG/M2 | TEMPERATURE: 98.3 F | DIASTOLIC BLOOD PRESSURE: 69 MMHG | HEART RATE: 54 BPM | WEIGHT: 173 LBS | OXYGEN SATURATION: 98 % | HEIGHT: 70 IN | SYSTOLIC BLOOD PRESSURE: 146 MMHG

## 2018-02-13 DIAGNOSIS — Z45.02 AICD AT END OF BATTERY LIFE: Primary | ICD-10-CM

## 2018-02-13 LAB
ANION GAP BLD CALC-SCNC: 17 MMOL/L (ref 10–20)
BUN BLD-MCNC: 18 MG/DL (ref 7–18)
CA-I BLD-MCNC: 1.27 MMOL/L (ref 1.12–1.32)
CHLORIDE BLD-SCNC: 109 MMOL/L (ref 100–108)
CO2 BLD-SCNC: 22 MMOL/L (ref 19–24)
CREAT UR-MCNC: 1.7 MG/DL (ref 0.6–1.3)
GLUCOSE BLD STRIP.AUTO-MCNC: 124 MG/DL (ref 74–106)
GLUCOSE BLD STRIP.AUTO-MCNC: 142 MG/DL (ref 70–110)
HCT VFR BLD CALC: 36 % (ref 36–49)
HGB BLD-MCNC: 12.2 G/DL (ref 12–16)
POTASSIUM BLD-SCNC: 4.2 MMOL/L (ref 3.5–5.5)
SODIUM BLD-SCNC: 143 MMOL/L (ref 136–145)

## 2018-02-13 PROCEDURE — 77030002933 EP STUDY

## 2018-02-13 PROCEDURE — 74011250636 HC RX REV CODE- 250/636

## 2018-02-13 PROCEDURE — 74011250636 HC RX REV CODE- 250/636: Performed by: ANESTHESIOLOGY

## 2018-02-13 PROCEDURE — 80047 BASIC METABLC PNL IONIZED CA: CPT

## 2018-02-13 PROCEDURE — 82962 GLUCOSE BLOOD TEST: CPT

## 2018-02-13 PROCEDURE — 74011250636 HC RX REV CODE- 250/636: Performed by: INTERNAL MEDICINE

## 2018-02-13 PROCEDURE — 74011250637 HC RX REV CODE- 250/637: Performed by: INTERNAL MEDICINE

## 2018-02-13 PROCEDURE — 74011000250 HC RX REV CODE- 250: Performed by: INTERNAL MEDICINE

## 2018-02-13 PROCEDURE — 76060000032 HC ANESTHESIA 0.5 TO 1 HR

## 2018-02-13 RX ORDER — FENTANYL CITRATE 50 UG/ML
25 INJECTION, SOLUTION INTRAMUSCULAR; INTRAVENOUS
Status: DISCONTINUED | OUTPATIENT
Start: 2018-02-13 | End: 2018-02-13 | Stop reason: HOSPADM

## 2018-02-13 RX ORDER — MAGNESIUM SULFATE 100 %
4 CRYSTALS MISCELLANEOUS AS NEEDED
Status: DISCONTINUED | OUTPATIENT
Start: 2018-02-13 | End: 2018-02-13 | Stop reason: HOSPADM

## 2018-02-13 RX ORDER — GENTAMICIN SULFATE 40 MG/ML
80 INJECTION, SOLUTION INTRAMUSCULAR; INTRAVENOUS ONCE
Status: COMPLETED | OUTPATIENT
Start: 2018-02-13 | End: 2018-02-13

## 2018-02-13 RX ORDER — SODIUM CHLORIDE 9 MG/ML
25 INJECTION, SOLUTION INTRAVENOUS CONTINUOUS
Status: DISCONTINUED | OUTPATIENT
Start: 2018-02-13 | End: 2018-02-13 | Stop reason: HOSPADM

## 2018-02-13 RX ORDER — SODIUM CHLORIDE 9 MG/ML
INJECTION, SOLUTION INTRAVENOUS
Status: DISCONTINUED | OUTPATIENT
Start: 2018-02-13 | End: 2018-02-13 | Stop reason: HOSPADM

## 2018-02-13 RX ORDER — OXYCODONE AND ACETAMINOPHEN 5; 325 MG/1; MG/1
1 TABLET ORAL
Qty: 15 TAB | Refills: 0 | Status: SHIPPED | OUTPATIENT
Start: 2018-02-13 | End: 2018-02-26 | Stop reason: ALTCHOICE

## 2018-02-13 RX ORDER — SODIUM CHLORIDE 0.9 % (FLUSH) 0.9 %
5-10 SYRINGE (ML) INJECTION AS NEEDED
Status: DISCONTINUED | OUTPATIENT
Start: 2018-02-13 | End: 2018-02-13 | Stop reason: HOSPADM

## 2018-02-13 RX ORDER — GENTAMICIN SULFATE 40 MG/ML
80 INJECTION, SOLUTION INTRAMUSCULAR; INTRAVENOUS EVERY 12 HOURS
Status: DISCONTINUED | OUTPATIENT
Start: 2018-02-13 | End: 2018-02-13

## 2018-02-13 RX ORDER — INSULIN LISPRO 100 [IU]/ML
INJECTION, SOLUTION INTRAVENOUS; SUBCUTANEOUS ONCE
Status: DISCONTINUED | OUTPATIENT
Start: 2018-02-13 | End: 2018-02-13 | Stop reason: HOSPADM

## 2018-02-13 RX ORDER — MIDAZOLAM HYDROCHLORIDE 1 MG/ML
.5-2 INJECTION, SOLUTION INTRAMUSCULAR; INTRAVENOUS
Status: DISCONTINUED | OUTPATIENT
Start: 2018-02-13 | End: 2018-02-13 | Stop reason: ALTCHOICE

## 2018-02-13 RX ORDER — CEFAZOLIN SODIUM 2 G/50ML
2 SOLUTION INTRAVENOUS ONCE
Status: COMPLETED | OUTPATIENT
Start: 2018-02-13 | End: 2018-02-13

## 2018-02-13 RX ORDER — FENTANYL CITRATE 50 UG/ML
12.5-1 INJECTION, SOLUTION INTRAMUSCULAR; INTRAVENOUS
Status: DISCONTINUED | OUTPATIENT
Start: 2018-02-13 | End: 2018-02-13 | Stop reason: ALTCHOICE

## 2018-02-13 RX ORDER — LIDOCAINE HYDROCHLORIDE 10 MG/ML
1-30 INJECTION, SOLUTION EPIDURAL; INFILTRATION; INTRACAUDAL; PERINEURAL
Status: DISCONTINUED | OUTPATIENT
Start: 2018-02-13 | End: 2018-02-13 | Stop reason: ALTCHOICE

## 2018-02-13 RX ORDER — MIDAZOLAM HYDROCHLORIDE 1 MG/ML
INJECTION, SOLUTION INTRAMUSCULAR; INTRAVENOUS AS NEEDED
Status: DISCONTINUED | OUTPATIENT
Start: 2018-02-13 | End: 2018-02-13 | Stop reason: HOSPADM

## 2018-02-13 RX ORDER — OXYCODONE AND ACETAMINOPHEN 5; 325 MG/1; MG/1
1 TABLET ORAL
Status: DISCONTINUED | OUTPATIENT
Start: 2018-02-13 | End: 2018-02-13 | Stop reason: HOSPADM

## 2018-02-13 RX ORDER — FENTANYL CITRATE 50 UG/ML
INJECTION, SOLUTION INTRAMUSCULAR; INTRAVENOUS AS NEEDED
Status: DISCONTINUED | OUTPATIENT
Start: 2018-02-13 | End: 2018-02-13 | Stop reason: HOSPADM

## 2018-02-13 RX ORDER — PROPOFOL 10 MG/ML
INJECTION, EMULSION INTRAVENOUS
Status: DISCONTINUED | OUTPATIENT
Start: 2018-02-13 | End: 2018-02-13 | Stop reason: HOSPADM

## 2018-02-13 RX ORDER — DEXTROSE 50 % IN WATER (D50W) INTRAVENOUS SYRINGE
25-50 AS NEEDED
Status: DISCONTINUED | OUTPATIENT
Start: 2018-02-13 | End: 2018-02-13 | Stop reason: HOSPADM

## 2018-02-13 RX ORDER — CEFAZOLIN SODIUM 2 G/50ML
2 SOLUTION INTRAVENOUS ONCE
Status: DISCONTINUED | OUTPATIENT
Start: 2018-02-13 | End: 2018-02-13 | Stop reason: HOSPADM

## 2018-02-13 RX ADMIN — PROPOFOL 50 MCG/KG/MIN: 10 INJECTION, EMULSION INTRAVENOUS at 10:12

## 2018-02-13 RX ADMIN — CEFAZOLIN SODIUM 2 G: 2 SOLUTION INTRAVENOUS at 10:07

## 2018-02-13 RX ADMIN — OXYCODONE HYDROCHLORIDE AND ACETAMINOPHEN 1 TABLET: 5; 325 TABLET ORAL at 11:43

## 2018-02-13 RX ADMIN — SODIUM CHLORIDE: 9 INJECTION, SOLUTION INTRAVENOUS at 10:07

## 2018-02-13 RX ADMIN — GENTAMICIN SULFATE 80 MG: 40 INJECTION, SOLUTION INTRAMUSCULAR; INTRAVENOUS at 10:35

## 2018-02-13 RX ADMIN — LIDOCAINE HYDROCHLORIDE 30 ML: 10 INJECTION, SOLUTION EPIDURAL; INFILTRATION; INTRACAUDAL; PERINEURAL at 10:30

## 2018-02-13 RX ADMIN — MIDAZOLAM HYDROCHLORIDE 2 MG: 1 INJECTION, SOLUTION INTRAMUSCULAR; INTRAVENOUS at 10:10

## 2018-02-13 RX ADMIN — SODIUM CHLORIDE 25 ML/HR: 900 INJECTION, SOLUTION INTRAVENOUS at 11:00

## 2018-02-13 RX ADMIN — FENTANYL CITRATE 25 MCG: 50 INJECTION, SOLUTION INTRAMUSCULAR; INTRAVENOUS at 10:47

## 2018-02-13 RX ADMIN — FENTANYL CITRATE 50 MCG: 50 INJECTION, SOLUTION INTRAMUSCULAR; INTRAVENOUS at 10:10

## 2018-02-13 RX ADMIN — FENTANYL CITRATE 25 MCG: 50 INJECTION, SOLUTION INTRAMUSCULAR; INTRAVENOUS at 10:30

## 2018-02-13 NOTE — PROGRESS NOTES
TRANSFER - IN REPORT:    Verbal report received from Denver Barton(name) on Daphne Jones  being received from EP lab(unit) for routine progression of care      Report consisted of patients Situation, Background, Assessment and   Recommendations(SBAR). Information from the following report(s) SBAR, Kardex, Procedure Summary, Intake/Output, MAR, Recent Results and Cardiac Rhythm SB was reviewed with the receiving nurse. Opportunity for questions and clarification was provided. Assessment completed upon patients arrival to unit and care assumed.

## 2018-02-13 NOTE — ROUTINE PROCESS
TRANSFER - OUT REPORT:    Verbal report given to Ulisses Steinberg RN(name) on Ameena Hairston  being transferred to [facility]) for routine post - op       Report consisted of patients Situation, Background, Assessment and   Recommendations(SBAR). Information from the following report(s) SBAR was reviewed with the receiving nurse. Lines:   Peripheral IV 02/13/18 Left Antecubital (Active)   Site Assessment Clean, dry, & intact 2/13/2018  9:44 AM   Phlebitis Assessment 0 2/13/2018  9:44 AM   Infiltration Assessment 0 2/13/2018  9:44 AM   Dressing Status Clean, dry, & intact 2/13/2018  9:44 AM   Dressing Type Transparent 2/13/2018  9:44 AM   Hub Color/Line Status Pink;Patent; Flushed 2/13/2018  9:44 AM   Alcohol Cap Used No 2/13/2018  9:44 AM        Opportunity for questions and clarification was provided.       Patient transported with:   [employer]

## 2018-02-13 NOTE — H&P
Please see full H&P. Plan AICD generator changeout. All risks, benefits, alternatives discussed. Plan moderate sedation if anesthesia not available. Risks included but not limited to pain, infection, bleeding, deep venous thrombosis with chronic swelling of arm, anesthesia reactions, pneumothorax, hemothorax, emergent open heart surgery, and death. All questions answered. History of Present Illness:  A 68 y.o. male referred by Dr. Mohit Ulrich for dual chamber Medtronic AICD requiring change out. He triggered ELIZABETH 11/23/17. He is not device dependent. He has not had any shocks. He had initial leads placed in 2003 and change-out mostly recently in 2009. Overall, doing well. He denies any new chest pain, dyspnea, PND, orthopnea or edema. He has noticed there has been slightly high atrial and ventricular thresholds. The right ventricular threshold is 2.5 V. There has been no documented noise.      Diagnosis:  Primary cardiologist Dr. Waylon Patel chamber Medtronic AICD initially placed 2003, change out 2009, placed for primary prevention for cardiomyopathy. Hypertrophic cardiomyopathy  Echo 2014 with EF 65%, moderate LVH  Slightly high atrial ventricular thresholds on leads. Right atrium is 2 V and the right ventricle is 2.5 V. Sensing is reasonable, impedance is stable. I suspect there is progressive fibrosis. Peripheral vascular disease. Chronic class II systolic heart failure. Diabetes. Obstructive sleep apnea. Chronic Plavix use. Chronic stage II kidney disease.     I discussed risks, benefits and alternatives to generator change out. At this time, he is not device dependent. He has not had any significant events with shocks recently. I discussed possible explantation. However with the thresholds just mildly elevated and stable, I believe the risk outweighs the benefit and I would continue to monitor. I will make arrangements for his generator change out.  Of note, he woke up last time during the procedure and I will make sure anesthesia is available and he has adequate MAC.             Past Medical History:   Diagnosis Date    Aortic valve disorders       2/10 mild AI    Automatic implantable cardiac defibrillator in situ       Needs remote check ASAP    Bone pain      CAD (coronary artery disease) 11/3/09    Cancer ([facility]       prostate    DDD (degenerative disc disease), lumbosacral       L5-S1    Diabetes mellitus (HCC)      Elevated prostate specific antigen (PSA) 3/10/08    Erectile dysfunction      Essential hypertension      H/O prostate cancer      Hypertension      Hypertrophic cardiomyopathy ([facility]       Improved LVH    Impotence of organic origin 3/10/08    Lower urinary tract symptoms (LUTS)      Nicotine addiction 11/3/09    Other and unspecified hyperlipidemia      PAD (peripheral artery disease) ([facility] 11/13/2015     add asa pending angio     Prostate cancer [facility])        s/p CRYO May 2011. TRUS bx showed T1a [place] 4+3, 60% one core    Skin ulcer (HCC)      SOBOE (shortness of breath on exertion)      Spinal stenosis      Stomach ulcer      Type II or unspecified type diabetes mellitus without mention of complication, not stated as uncontrolled       Controlled per wife    Unspecified sleep apnea       has CPAP    Wears glasses                  Current Outpatient Prescriptions   Medication Sig Dispense Refill    doxazosin (CARDURA) 1 mg tablet Take 1 Tab by mouth nightly. 30 Tab 2    clopidogrel (PLAVIX) 75 mg tab TAKE 1 TABLET DAILY 90 Tab 4    amLODIPine-valsartan (EXFORGE)  mg per tablet Take 1 Tab by mouth daily.  30 Tab 6    pioglitazone (ACTOS) 30 mg tablet Take 30 mg by mouth daily.        dorzolamide (TRUSOPT) 2 % ophthalmic solution Administer 2 Drops to both eyes two (2) times a day.        latanoprost (XALATAN) 0.005 % ophthalmic solution Administer 1 Drop to both eyes nightly.        glimepiride (AMARYL) 2 mg tablet Take 2 mg by mouth two (2) times a day.        sitaGLIPtin (JANUVIA) 100 mg tablet Take 100 mg by mouth daily.        atorvastatin (LIPITOR) 20 mg tablet Take 20 mg by mouth daily.        carvedilol (COREG) 25 mg tablet Take 25 mg by mouth two (2) times daily (with meals).             Social History   reports that he quit smoking about 6 years ago. He has a 25.00 pack-year smoking history. He has never used smokeless tobacco.   reports that he drinks alcohol.     Family History  family history includes Arthritis-osteo in an other family member; Heart Disease in his father; Hypertension in an other family member. There is no history of Heart Attack or Sudden Death.     Review of Systems  Except as stated above include:  Constitutional: Negative for fever, chills and malaise/fatigue. HEENT: No congestion or recent URI. Gastrointestinal: No nausea, vomiting, abdominal pain, bloody stools. Pulmonary:  Negative except as stated above. Cardiac:  Negative except as stated above. Musculoskeletal: Negative except as stated above. Neurological:  No localized symptoms. Skin:  Negative except as stated above. Psych:  No mood swings. Endocrine:  No heat/cold intolerance.     PHYSICAL EXAM      BP Readings from Last 3 Encounters:   02/01/18 158/72   11/06/17 168/77   10/17/17 127/90          Pulse Readings from Last 3 Encounters:   02/01/18 67   11/06/17 (!) 53   10/17/17 (!) 56          Wt Readings from Last 3 Encounters:   02/01/18 82.6 kg (182 lb)   11/06/17 78.9 kg (174 lb)   10/17/17 79.1 kg (174 lb 6.1 oz)      General:                    Well developed, well groomed. Head/Neck:               No jugular venous distention                                               No carotid bruits. No evidence of xanthelasma. Lungs:                       No respiratory distress. Clear bilaterally.   Heart: Regular rate and rhythm. Normal S1/S2. Palpation of heart with normal point of maximum impulse. No significant murmurs, rubs or gallops. Left aicd intact. Abdomen:                  Soft and nontender. No palpable abdominal mass or bruits. Extremities:               Intact peripheral pulses. No significant edema. Neurological:             Alert and oriented to person, place, time.                                                 No focal neurological deficit visually.     Blood Pressure Metric:  Gabrielle Ward has been given the following recommendations today due to his elevated BP reading: lifestyle modifications to include: dietary sodium restriction.         Electronically signed by Billie Fenton MD at 02/01/18 3076

## 2018-02-13 NOTE — PROGRESS NOTES
1100: Pt received from EP, alert and oriented x4, denies pain. Left chest wall incision c/d/i, no oozing/hematoma formation. Anesthesiologist at bedside for post procedural assessment. Pt verbalizes understanding of left arm restrictions, order for discharge in 2 hours. Wife at bedside  1130: Pt tolerating PO, denies nausea. Incision c/d/i.   1145: Pt describes incisional pain as 7/10, percocet PO administered. 1300: Pt discharge instructions reviewed with patient and wife, verbalize understanding. Incision c/d/i, no oozing/hematoma formation. Pt states pain is relieved, sling applied to left arm. PIV removed, dsd applied. Escorted to wife's car via wheelchair, discharged home.

## 2018-02-13 NOTE — IP AVS SNAPSHOT
[address] Patient: Morse Goodell MRN: [id number] CAK:[id number] About your hospitalization You were admitted on:  February 13, 2018 You last received care in the:  [facility] You were discharged on:  February 13, 2018 Why you were hospitalized Your primary diagnosis was:  Aicd At End Of Battery Life Follow-up Information Follow up With Details Comments Contact Info Kel Cook MD   [address] [place] [address] [address] [place] [address] 
[phone] Xander Zavala MD Schedule an appointment as soon as possible for a visit in 10 days For wound re-check [place] [address] [address] [facility] 
[phone] Discharge Orders None A check yee indicates which time of day the medication should be taken. My Medications START taking these medications Instructions Each Dose to Equal  
 Morning Noon Evening Bedtime  
 oxyCODONE-acetaminophen 5-325 mg per tablet Commonly known as:  PERCOCET Your last dose was: Your next dose is: Take 1 Tab by mouth every six (6) hours as needed for Pain. Max Daily Amount: 4 Tabs. 1 Tab CONTINUE taking these medications Instructions Each Dose to Equal  
 Morning Noon Evening Bedtime ACTOS 30 mg tablet Generic drug:  pioglitazone Your last dose was: Your next dose is: Take 30 mg by mouth daily. 30 mg  
    
   
   
   
  
 amLODIPine-valsartan  mg per tablet Commonly known as:  Deedee Jennyfer Your last dose was: Your next dose is: Take 1 Tab by mouth daily. 1 Tab  
    
   
   
   
  
 atorvastatin 20 mg tablet Commonly known as:  LIPITOR Your last dose was: Your next dose is: Take 20 mg by mouth daily.   
 20 mg  
    
   
   
   
 carvedilol 25 mg tablet Commonly known as:  Imelda Tineo Your last dose was: Your next dose is: Take 25 mg by mouth two (2) times daily (with meals). 25 mg  
    
   
   
   
  
 dorzolamide 2 % ophthalmic solution Commonly known as:  TRUSOPT Your last dose was: Your next dose is:    
   
   
 Administer 2 Drops to both eyes two (2) times a day. 2 Drop  
    
   
   
   
  
 doxazosin 1 mg tablet Commonly known as:  CARDURA Your last dose was: Your next dose is: Take 1 Tab by mouth nightly. 1 mg  
    
   
   
   
  
 glimepiride 2 mg tablet Commonly known as:  AMARYL Your last dose was: Your next dose is: Take 2 mg by mouth two (2) times a day. 2 mg JANUVIA 100 mg tablet Generic drug:  SITagliptin Your last dose was: Your next dose is: Take 100 mg by mouth daily. 100 mg  
    
   
   
   
  
 latanoprost 0.005 % ophthalmic solution Commonly known as:  True Ready Your last dose was: Your next dose is:    
   
   
 Administer 1 Drop to both eyes nightly. 1 Drop Where to Get Your Medications Information on where to get these meds will be given to you by the nurse or doctor. ! Ask your nurse or doctor about these medications  
  oxyCODONE-acetaminophen 5-325 mg per tablet Discharge Instructions Disposition: 
Will need follow-up with device/wound check in 7-10 days in my office. Please contact office at [phone] to confirm appointment. Main Office:   
 [address], [address] [facility] 27 Restrictions: For affected arm:  No lifting greater than 10 lbs or lifting elbow above shoulder for 4 weeks. Keep incision clean and dry for a total of 72 hours after procedure. Remove dressing in 24 hours if not already removed. Please remove the steristrips (small white adhesive strips over wound) after 7 days if they have not already fallen off. No hot tubs or pools for 2 weeks. OK to shower with \"pat\" dry incision after 72 hours. No driving ideally for 2 weeks due to concern for airbag. ACO Transitions of Care Introducing [employer] [address] Nicole Damon offers a voluntary care coordination program to provide high quality service and care to [facility] fee-for-service beneficiaries. Lacy Hernandez was designed to help you enhance your health and well-being through the following services: ? Transitions of Care  support for individuals who are transitioning from one care setting to another (example: Hospital to home). ? Chronic and Complex Care Coordination  support for individuals and caregivers of those with serious or chronic illnesses or with more than one chronic (ongoing) condition and those who take a number of different medications. If you meet specific medical criteria, a [address] [place] [address] Rd may call you directly to coordinate your care with your primary care physician and your other care providers. For questions about the [facility] programs, please, contact your physicians office. For general questions or additional information about Accountable Care Organizations: 
Please visit www.medicare.gov/acos. html or call 1-800-MEDICARE (4-804.169.2838) TTY users should call 4-386.383.7884. Providers Seen During Your Hospitalization Provider Specialty Primary office phone Georgie Tavera MD Cardiology [phone] Your Primary Care Physician (PCP) Primary Care Physician Office Phone Office Fax Ashley Watkins [phone] You are allergic to the following Allergen Reactions Fish Oil (Docosahexanoic Acid-Epa) Rash Itching Recent Documentation Height Weight BMI Smoking Status 1.778 m 78.5 kg 24.82 kg/m2 Former Smoker Emergency Contacts Name Discharge Info Relation Home Work Mobile Princess Wolf DISCHARGE CAREGIVER [3] Spouse [3] 21 [phone]  
 Princess RHONDA  Spouse [3] [phone] Patient Belongings The following personal items are in your possession at time of discharge: 
                             
 
  
  
Discharge Instructions Attachments/References OXYCODONE/ACETAMINOPHEN (BY MOUTH) (ENGLISH) Patient Handouts Oxycodone/Acetaminophen (By mouth) Acetaminophen (v-hfks-z-MIN-oh-fen), Oxycodone Hydrochloride (se-k-RSJ-done shasta-droe-KLOR-esme) Treats moderate to moderately severe pain. This medicine is a narcotic pain reliever. Brand Name(s): Endocet, Percocet, Primlev, Xartemis XR There may be other brand names for this medicine. When This Medicine Should Not Be Used: This medicine is not right for everyone. Do not use it if you had an allergic reaction to acetaminophen or oxycodone, or if you have serious breathing problems or paralytic ileus. How to Use This Medicine:  
Capsule, Liquid, Tablet, Long Acting Tablet · Your doctor will tell you how much medicine to use. Do not use more than directed. · An overdose can be dangerous. Follow directions carefully so you do not get too much medicine at one time. · Oral liquid: Measure the oral liquid medicine with a marked measuring spoon, oral syringe, or medicine cup. · Swallow the extended-release tablet whole. Do not crush, break, or chew it. Do not lick or wet the tablet before placing it in your mouth. Do not give this medicine through a feeding tube. · This medicine should come with a Medication Guide. Ask your pharmacist for a copy if you do not have one. · Missed dose: If you miss a dose of this medicine, skip the missed dose and go back to your regular dosing schedule. Do not double doses. · Store the medicine in a closed container at room temperature, away from heat, moisture, and direct light. Ask your pharmacist about the best way to dispose of medicine you do not use. Drugs and Foods to Avoid: Ask your doctor or pharmacist before using any other medicine, including over-the-counter medicines, vitamins, and herbal products. · Do not use Xartemis XR if you are using or have used an MAO inhibitor in the past 14 days. · Some medicines can affect how this medicine works. Tell your doctor if you are using any of the following: ¨ Carbamazepine, erythromycin, ketoconazole, lamotrigine, mirtazapine, naltrexone, phenytoin, propranolol, rifampin, ritonavir, tramadol, trazodone, or zidovudine ¨ Birth control pills ¨ Diuretic (water pill) ¨ Medicine to treat depression ¨ Phenothiazine medicine ¨ Triptan medicine to treat migraine headaches · Do not drink alcohol while you are using this medicine. Acetaminophen can damage your liver, and alcohol can increase this risk. Do not take acetaminophen without asking your doctor if you have 3 or more drinks of alcohol every day. · Tell your doctor if you use anything else that makes you sleepy. Some examples are allergy medicine, narcotic pain medicine, and alcohol. Tell your doctor if you are using buprenorphine, butorphanol, nalbuphine, pentazocine, a benzodiazepine, or a muscle relaxer. Warnings While Using This Medicine: · Tell your doctor if you are pregnant or breastfeeding, or if you have kidney disease, liver disease, heart disease, low blood pressure, breathing problems or lung disease (such as asthma, COPD), thyroid problems, [place] disease, pancreas or gallbladder problems, prostate problems, trouble urinating, or a stomach problems, or a history of head injury or brain damage, seizures, or alcohol or drug abuse. Tell your doctor if you are allergic to codeine. · This medicine may cause the following problems: ¨ High risk of overdose, which can lead to death ¨ Respiratory depression (serious breathing problem that can be life-threatening) ¨ Liver problems ¨ Serious skin reactions ¨ Serotonin syndrome (when used with certain medicines) · This medicine may make you dizzy or drowsy. Do not drive or do anything that could be dangerous until you know how this medicine affects you. Sit or lie down if you feel dizzy. Stand up carefully. · This medicine contains acetaminophen. Read the labels of all other medicines you are using to see if they also contain acetaminophen, or ask your doctor or pharmacist. Mj Hernandez not use more than 4 grams (4,000 milligrams) total of acetaminophen in one day. · This medicine can be habit-forming. Do not use more than your prescribed dose. Call your doctor if you think your medicine is not working. · Do not stop using this medicine suddenly. Your doctor will need to slowly decrease your dose before you stop it completely. · This medicine could cause infertility. Talk with your doctor before using this medicine if you plan to have children. · This medicine may cause constipation, especially with long-term use. Ask your doctor if you should use a laxative to prevent and treat constipation. · Keep all medicine out of the reach of children. Never share your medicine with anyone. Possible Side Effects While Using This Medicine:  
Call your doctor right away if you notice any of these side effects: · Allergic reaction: Itching or hives, swelling in your face or hands, swelling or tingling in your mouth or throat, chest tightness, trouble breathing · Anxiety, restlessness, fast heartbeat, fever, muscle spasms, twitching, diarrhea, seeing or hearing things that are not there · Blistering, peeling, red skin rash · Blue lips, fingernails, or skin · Dark urine or pale stools, loss of appetite, stomach pain, yellow skin or eyes · Extreme weakness, shallow breathing, uneven heartbeat, seizures, sweating, or cold or clammy skin · Severe confusion, lightheadedness, dizziness, or fainting · Severe constipation, nausea, or vomiting · Trouble breathing or slow breathing If you notice these less serious side effects, talk with your doctor:  
· Headache · Mild constipation, nausea, or vomiting · Mild sleepiness or drowsiness If you notice other side effects that you think are caused by this medicine, tell your doctor. Call your doctor for medical advice about side effects. You may report side effects to FDA at 8-689-FDA-4996 © 2017 [address] Information is for End User's use only and may not be sold, redistributed or otherwise used for commercial purposes. The above information is an  only. It is not intended as medical advice for individual conditions or treatments. Talk to your doctor, nurse or pharmacist before following any medical regimen to see if it is safe and effective for you. Please provide this summary of care documentation to your next provider. Signatures-by signing, you are acknowledging that this After Visit Summary has been reviewed with you and you have received a copy. Patient Signature:  ____________________________________________________________ Date:  ____________________________________________________________  
  
Dany Payor Provider Signature:  ____________________________________________________________ Date:  ____________________________________________________________

## 2018-02-13 NOTE — DISCHARGE INSTRUCTIONS
Disposition:  Will need follow-up with device/wound check in 7-10 days in my office. Please contact office at [phone] to confirm appointment. Main Office:    [address] [place] [address]    Restrictions: For affected arm:  No lifting greater than 10 lbs or lifting elbow above shoulder for 4 weeks. Keep incision clean and dry for a total of 72 hours after procedure. Remove dressing in 24 hours if not already removed. Please remove the steristrips (small white adhesive strips over wound) after 7 days if they have not already fallen off. No hot tubs or pools for 2 weeks. OK to shower with \"pat\" dry incision after 72 hours. No driving ideally for 2 weeks due to concern for airbag.

## 2018-02-13 NOTE — PROCEDURES
PROCEDURES   - Generator changeout of dual chamber Medtronic automatic implantable cardioverter defibrillator. Initially placed for primary prevention.  - MAC sedation by anesthesia. Diagnosis:  Primary cardiologist Dr. Syd Dobbs chamber Medtronic AICD initially placed 2003, change out 2009, placed for primary prevention for cardiomyopathy. Hypertrophic cardiomyopathy  Echo 2014 with EF 65%, moderate LVH  Slightly high atrial ventricular thresholds on leads. Right atrium is 2 V and the right ventricle is 2.5 V. Sensing is reasonable, impedance is stable. I suspect there is progressive fibrosis. Peripheral vascular disease. Chronic class II systolic heart failure. Diabetes. Obstructive sleep apnea. Chronic Plavix use. Chronic stage II kidney disease. Not on ACEI/ARB due to kidney disease, intolerant. Chronic beta-blocker > 3 months. PROCEDURE: After informed consent was obtained, the patient was brought to the electrophysiology lab in a fasting, nonsedated state. The left chest was prepped and draped in the usual sterile fashion. Local lidocaine was infiltrated just below the left clavicle. A 4-cm transverse incision was created in the left chest above the old device. Using electrocautery and blunt dissection, the device was exposed and removed from the pocket. The leads were detached. Hemostasis was confirmed. The pocket was washed with antibiotic solution and the generator was attached to the leads placed in the pocket and sutured in place. The pocket was closed with 2-0 Vicryl in 2 deep layers. Skin was closed with 4-0 monocryl. Steri-Strips and a dressing were applied. No DFT testing was performed, as previously discussed. DEVICE DETAILS:  See separate scanned report. IMPRESSION:   -Successful generator changeout of dual chamber AICD.     -Plan to discharge home later today.  -Wound check in my office in 7-10 days, then see primary cardiologist, Dr. Hodge Reason as previously scheduled. .    Thank you kindly for allowing me to participate in this patient's care.   Please do not hesitate to contact me if any questions.      -----------------------------------------------------------------------------------------------------------  AICD Core Measures  Beta-blocker Therapy:   [   ]  Not indicated   [   ]  Intolerant due to [   ]  Allergy/reaction  [   ]  Hypotension   [   ]  Lung disease   [x   ]  Already prescribed  ACEI/ARB Therapy:   [   ]  Not indicated   [x   ]  Intolerant due to [   ]  Allergy/reaction  [   ]  Hypotension   [x   ]  Renal disease   [   ]  Already prescribed  Indication:  [ x  ]  Device already in place and changeout due:    [   ]  Previous VT    [ x  ]  Primary prevention   [   ]  Previous EP study with inducible VT  [   ]  Secondary prevention with documented spontaneous or inducible cardiac arrest/VT          Primary prevention for:   [   ]  High risk for VT/VF due to long QT/HCM or other inherited familial condition   [   ]  CAD w/prior MI and inducible VT/VF during EP study   [   ]  Prior MI with class II/III heart failure and EF <= 35%   [   ]  Nonischemic cardiomyopathy with EF < 35% and heart failure class II/III

## 2018-02-13 NOTE — ANESTHESIA POSTPROCEDURE EVALUATION
Post-Anesthesia Evaluation and Assessment    Patient: Maxi Colvin MRN: [id number]  SSN: [id number]    YOB: 1940  Age: 68 y.o. Sex: male     VS from flow sheet    Cardiovascular Function/Vital Signs  Visit Vitals    /73    Pulse (!) 52    Temp 36.8 °C (98.3 °F)    Resp (!) 31    Ht 5' 10\" (1.778 m)    Wt 78.5 kg (173 lb)    SpO2 97%    BMI 24.82 kg/m2       Patient is status post MAC anesthesia for * No procedures listed *. Nausea/Vomiting: None    Postoperative hydration reviewed and adequate. Pain:  Pain Scale 1: Numeric (0 - 10) (02/13/18 1143)  Pain Intensity 1: 7 (02/13/18 1143)   Managed    Neurological Status: At baseline    Mental Status and Level of Consciousness: Arousable    Pulmonary Status:   O2 Device: Room air (02/13/18 1059)   Adequate oxygenation and airway patent    Complications related to anesthesia: None    Post-anesthesia assessment completed.  No concerns    Signed By: Shorty Gaines MD     February 13, 2018

## 2018-02-13 NOTE — ROUTINE PROCESS
TRANSFER - IN REPORT:    Verbal report received from hospitals (name) on Nickie Earl  being [place] [facility] [place] [place]      Report consisted of patients Situation, Background, Assessment and   Recommendations(SBAR). Information from the following report(s) SBAR, Kardex, Procedure Summary and MAR was reviewed with the receiving nurse. Opportunity for questions and clarification was provided. Assessment completed upon patients arrival to unit and care assumed.

## 2018-02-13 NOTE — ANESTHESIA PREPROCEDURE EVALUATION
Anesthetic History   No history of anesthetic complications            Review of Systems / Medical History  Patient summary reviewed and pertinent labs reviewed    Pulmonary        Sleep apnea           Neuro/Psych   Within defined limits           Cardiovascular    Hypertension          Pacemaker and CAD         GI/Hepatic/Renal           PUD     Endo/Other    Diabetes: type 2    Obesity and arthritis     Other Findings   Comments: Documentation of current medication  Current medications obtained, documented and obtained? YES      Risk Factors for Postoperative nausea/vomiting:       History of postoperative nausea/vomiting? NO       Female? NO       Motion sickness? NO       Intended opioid administration for postoperative analgesia? NO      Smoking Abstinence:  Current Smoker? NO  Elective Surgery? YES  Seen preoperatively by anesthesiologist or proxy prior to day of surgery? YES  Pt abstained from smoking 24 hours prior to anesthesia?  N/A    Preventive care/screening for High Blood Pressure:  Aged 18 years and older: YES  Screened for high blood pressure: YES  Patients with high blood pressure referred to primary care provider   for BP management: YES                 Physical Exam    Airway  Mallampati: III  TM Distance: 4 - 6 cm  Neck ROM: decreased range of motion, short neck   Mouth opening: Diminished (comment)     Cardiovascular    Rhythm: irregular  Rate: normal         Dental    Dentition: Poor dentition and Full upper dentures     Pulmonary  Breath sounds clear to auscultation               Abdominal  GI exam deferred       Other Findings            Anesthetic Plan    ASA: 4  Anesthesia type: MAC            Anesthetic plan and risks discussed with: Patient

## 2018-02-13 NOTE — IP AVS SNAPSHOT
[address] Patient: Adriel Whitehead MRN: [id number] FAN:4/29/2096 A check yee indicates which time of day the medication should be taken. My Medications START taking these medications Instructions Each Dose to Equal  
 Morning Noon Evening Bedtime  
 oxyCODONE-acetaminophen 5-325 mg per tablet Commonly known as:  PERCOCET Your last dose was: Your next dose is: Take 1 Tab by mouth every six (6) hours as needed for Pain. Max Daily Amount: 4 Tabs. 1 Tab CONTINUE taking these medications Instructions Each Dose to Equal  
 Morning Noon Evening Bedtime ACTOS 30 mg tablet Generic drug:  pioglitazone Your last dose was: Your next dose is: Take 30 mg by mouth daily. 30 mg  
    
   
   
   
  
 amLODIPine-valsartan  mg per tablet Commonly known as:  Tim Guzman Your last dose was: Your next dose is: Take 1 Tab by mouth daily. 1 Tab  
    
   
   
   
  
 atorvastatin 20 mg tablet Commonly known as:  LIPITOR Your last dose was: Your next dose is: Take 20 mg by mouth daily. 20 mg  
    
   
   
   
  
 carvedilol 25 mg tablet Commonly known as:  Tres Camryn Your last dose was: Your next dose is: Take 25 mg by mouth two (2) times daily (with meals). 25 mg  
    
   
   
   
  
 dorzolamide 2 % ophthalmic solution Commonly known as:  TRUSOPT Your last dose was: Your next dose is:    
   
   
 Administer 2 Drops to both eyes two (2) times a day. 2 Drop  
    
   
   
   
  
 doxazosin 1 mg tablet Commonly known as:  CARDURA Your last dose was: Your next dose is: Take 1 Tab by mouth nightly. 1 mg  
    
   
   
   
  
 glimepiride 2 mg tablet Commonly known as:  AMARYL Your last dose was: Your next dose is: Take 2 mg by mouth two (2) times a day. 2 mg JANUVIA 100 mg tablet Generic drug:  SITagliptin Your last dose was: Your next dose is: Take 100 mg by mouth daily. 100 mg  
    
   
   
   
  
 latanoprost 0.005 % ophthalmic solution Commonly known as:  Alexandra Abilio Your last dose was: Your next dose is:    
   
   
 Administer 1 Drop to both eyes nightly. 1 Drop Where to Get Your Medications Information on where to get these meds will be given to you by the nurse or doctor. ! Ask your nurse or doctor about these medications  
  oxyCODONE-acetaminophen 5-325 mg per tablet

## 2018-02-21 ENCOUNTER — CLINICAL SUPPORT (OUTPATIENT)
Dept: CARDIOLOGY CLINIC | Age: 78
End: 2018-02-21

## 2018-02-21 DIAGNOSIS — Z95.810 ICD (IMPLANTABLE CARDIOVERTER-DEFIBRILLATOR), DUAL, IN SITU: ICD-10-CM

## 2018-02-21 DIAGNOSIS — I42.2 HYPERTROPHIC CARDIOMYOPATHY (HCC): Primary | ICD-10-CM

## 2018-02-21 NOTE — PROGRESS NOTES
I have personally seen and evaluated the device findings. Interrogation reviewed and I agree with assessment.     Jennifer Bonds

## 2018-02-26 ENCOUNTER — OFFICE VISIT (OUTPATIENT)
Dept: CARDIOLOGY CLINIC | Age: 78
End: 2018-02-26

## 2018-02-26 VITALS
HEIGHT: 70 IN | DIASTOLIC BLOOD PRESSURE: 64 MMHG | HEART RATE: 51 BPM | SYSTOLIC BLOOD PRESSURE: 135 MMHG | WEIGHT: 177 LBS | BODY MASS INDEX: 25.34 KG/M2

## 2018-02-26 DIAGNOSIS — E78.00 PURE HYPERCHOLESTEROLEMIA: ICD-10-CM

## 2018-02-26 DIAGNOSIS — I42.2 HYPERTROPHIC CARDIOMYOPATHY (HCC): ICD-10-CM

## 2018-02-26 DIAGNOSIS — I10 ESSENTIAL HYPERTENSION, BENIGN: Primary | ICD-10-CM

## 2018-02-26 DIAGNOSIS — Z95.810 ICD (IMPLANTABLE CARDIOVERTER-DEFIBRILLATOR), DUAL, IN SITU: ICD-10-CM

## 2018-02-26 DIAGNOSIS — I25.10 ATHEROSCLEROSIS OF NATIVE CORONARY ARTERY OF NATIVE HEART WITHOUT ANGINA PECTORIS: ICD-10-CM

## 2018-02-26 DIAGNOSIS — I35.1 NONRHEUMATIC AORTIC VALVE INSUFFICIENCY: ICD-10-CM

## 2018-02-26 RX ORDER — AMLODIPINE AND VALSARTAN 5; 320 MG/1; MG/1
1 TABLET ORAL DAILY
COMMUNITY

## 2018-02-26 RX ORDER — ASPIRIN 81 MG/1
TABLET ORAL DAILY
COMMUNITY

## 2018-02-26 RX ORDER — HYDROCODONE BITARTRATE AND ACETAMINOPHEN 10; 325 MG/1; MG/1
1 TABLET ORAL
COMMUNITY

## 2018-02-26 NOTE — LETTER
Aiyana Darby 
[birth year] 
 
2/26/2018 Dear Kathrin Coates MD 
 
I had the pleasure of evaluating  Mr. Hyacinth Bae in office today. Below are the relevant portions of my assessment and plan of care. ICD-10-CM ICD-9-CM 1. Essential hypertension, benign I10 401.1 2/18 controlled; 11/17 high, no meds so far today; advised to take exforge early am daily; 2. Hypertrophic cardiomyopathy (HCC) I42.2 425.18 2D ECHO COMPLETE ADULT (TTE) Improved LVH since HTN treated 3. Pure hypercholesterolemia E78.00 272.0   
 11/16 LDL51; 
get from PCP 4. Atherosclerosis of native coronary artery of native heart without angina pectoris I25.10 414.01   
 11/17 not on asa per GI;  likely does not have CAD 5. Nonrheumatic aortic valve insufficiency I35.1 424.1 2D ECHO COMPLETE ADULT (TTE)  
 2/10; 1/14 mild AI 
  
6. ICD (implantable cardioverter-defibrillator), dual, in situ Z95.810 V45.02   
  2/18 nl function; wound healing well Current Outpatient Prescriptions Medication Sig Dispense Refill  aspirin delayed-release 81 mg tablet Take  by mouth daily.  amLODIPine-valsartan (EXFORGE) 5-320 mg per tablet Take 1 Tab by mouth daily.  HYDROcodone-acetaminophen (NORCO)  mg tablet Take 1 Tab by mouth.  doxazosin (CARDURA) 1 mg tablet Take 1 Tab by mouth nightly. 30 Tab 2  pioglitazone (ACTOS) 30 mg tablet Take 30 mg by mouth daily.  dorzolamide (TRUSOPT) 2 % ophthalmic solution Administer 2 Drops to both eyes two (2) times a day.  latanoprost (XALATAN) 0.005 % ophthalmic solution Administer 1 Drop to both eyes nightly.  glimepiride (AMARYL) 2 mg tablet Take 2 mg by mouth two (2) times a day.  sitaGLIPtin (JANUVIA) 100 mg tablet Take 100 mg by mouth daily.  atorvastatin (LIPITOR) 20 mg tablet Take 20 mg by mouth daily.  carvedilol (COREG) 25 mg tablet Take 25 mg by mouth two (2) times daily (with meals). Orders Placed This Encounter  2D ECHO COMPLETE ADULT (TTE) Standing Status:   Future Standing Expiration Date:   8/26/2018 Order Specific Question:   Reason for Exam: Answer:   AI  
 aspirin delayed-release 81 mg tablet Sig: Take  by mouth daily.  amLODIPine-valsartan (EXFORGE) 5-320 mg per tablet Sig: Take 1 Tab by mouth daily.  HYDROcodone-acetaminophen (NORCO)  mg tablet Sig: Take 1 Tab by mouth. If you have questions, please do not hesitate to call me. I look forward to following Mr. Martha Slaughter along with you. Sincerely, Ollie Ortez MD

## 2018-02-26 NOTE — PATIENT INSTRUCTIONS
please get remote checks for pacer or ICD every 3 months and Office check in 1 yr  Please call our office after every transmission to confirm success of transmission  Medications Discontinued During This Encounter   Medication Reason    amLODIPine-valsartan (EXFORGE)  mg per tablet Dose Adjustment    oxyCODONE-acetaminophen (PERCOCET) 5-325 mg per tablet Alternate Therapy       Orders Placed This Encounter    2D ECHO COMPLETE ADULT (TTE)     Standing Status:   Future     Standing Expiration Date:   8/26/2018     Order Specific Question:   Reason for Exam:     Answer:   AI          Aortic Valve Regurgitation: Care Instructions  Your Care Instructions    The aortic valve works like a one-way gate. It opens so that blood can leave the heart and flow to the rest of the body. When the heart rests between beats, the aortic valve closes to keep blood from flowing backward into the heart. When the aortic valve does not close properly, some of the blood leaks back (regurgitates) through the valve into the heart. Then your heart has to work harder to pump blood throughout your body. You can have this condition for many years before it gets worse and you have symptoms. Your doctor will monitor your heart, and you may need to take medicines. If the disease becomes severe, you may need to have surgery to replace the valve. Follow-up care is a key part of your treatment and safety. Be sure to make and go to all appointments, and call your doctor if you are having problems. It's also a good idea to know your test results and keep a list of the medicines you take. How can you care for yourself at home? · Be safe with medicines. Take your medicines exactly as prescribed. Call your doctor if you think you are having a problem with your medicine. You will get more details on the specific medicines your doctor prescribes.   · If you have an artificial valve, you may need to take antibiotics before you have certain dental or surgical procedures. The antibiotics help prevent an infection in your heart called endocarditis. · Eat heart-healthy foods such as fruits, vegetables, whole grains, fish, lean meats, and low-fat or nonfat dairy foods. Limit sodium, sugar, and alcohol. · Be active. Ask your doctor what type and level of exercise is safe for you. Walking is a good choice. You also may want to swim, bike, or do other activities. Talk with your doctor before doing strenuous activities or weightlifting. · Do not smoke. Smoking can make heart problems worse. If you need help quitting, talk to your doctor about stop-smoking programs and medicines. These can increase your chances of quitting for good. · Stay at a healthy weight. Lose weight if you need to. · Avoid colds and flu. Get a pneumococcal vaccine shot. If you have had one before, ask your doctor whether you need another dose. Get a flu vaccine every year. If you must be around people with colds or flu, wash your hands often. · Take care of your teeth and gums. Get regular dental checkups. Good dental health is important because bacteria can spread from infected teeth and gums to the heart valves. When should you call for help? Call 911 anytime you think you may need emergency care. For example, call if:  ? · You have signs of acute aortic valve regurgitation such as:  ¨ Severe shortness of breath. ¨ A rapid heart rate. ¨ Lightheadedness. ? · You have symptoms of sudden heart failure such as:  ¨ Severe trouble breathing. ¨ Coughing up pink, foamy mucus. ¨ A new irregular or rapid heartbeat. ?Call your doctor now or seek immediate medical care if:  ? · You have new or increased shortness of breath. ? · You are dizzy or lightheaded, or you feel like you may faint. ? · You have sudden weight gain, such as more than 2 to 3 pounds in a day or 5 pounds in a week. (Your doctor may suggest a different range of weight gain.)   ?  · You have increased swelling in your legs, ankles, or feet. ? · You are suddenly so tired or weak that you cannot do your usual activities. ? Watch closely for changes in your health, and be sure to contact your doctor if you have any problems. Where can you learn more? Go to http://rachel-ulisses.info/. Enter V406 in the search box to learn more about \"Aortic Valve Regurgitation: Care Instructions. \"  Current as of: September 21, 2016  Content Version: 11.4  © 4518-5781 [employer]. Care instructions adapted under license by [employer] (which disclaims liability or warranty for this information). If you have questions about a medical condition or this instruction, always ask your healthcare professional. [address] any warranty or liability for your use of this information.

## 2018-02-26 NOTE — MR AVS SNAPSHOT
[address] [facility] 
 
 
 [address] [facility], [address] [facility] [address] 
[phone] Patient: Daphne Jones MRN: [id number] GSF:0/36/0878 Visit Information Date & Time Provider Department Dept. Phone Encounter #  
 2/26/2018  8:45 AM Hilda Ruiz MD [facility] [address] [place] [address] [id number] Follow-up Instructions Return in about 6 months (around 8/26/2018), or if symptoms worsen or fail to improve, for post test.  
  
Your Appointments 4/27/2018 10:00 AM  
PROCEDURE with [facility] NONIMAGING [facility] ([address] [place] [address]) Appt Note: LEG ART WENDY W/TM [place] [address] [place] [address] [address] [facility] [address]  
[phone] [address] [place] [address]  
  
    
 5/10/2018  9:45 AM  
Follow Up with [facility] ([address] [place] [address]) Appt Note: 1 YEAR FU AFTER STUDY AT OUR LAB  
 [address] [facility] [address]  
[phone] [address] [place] [address]  
  
    
 7/27/2018 11:30 AM  
PROCEDURE with CA ECHO [facility] [place] ([address] [facility]) Appt Note: fabian [address] [facility], [address] [facility] [address]  
[phone]  
  
   
 [address] [facility], [address] [place] [id number]  
  
    
 8/27/2018  9:30 AM  
Office Visit with Hilda Ruiz MD  
[facility]) Appt Note: 6 months post echo [address] [facility], [address] [facility] [address]  
[address], [address] [facility] [address] Upcoming Health Maintenance Date Due  
 FOOT EXAM Q1 8/15/1950 EYE EXAM RETINAL OR DILATED Q1 8/15/1950 DTaP/Tdap/Td series (1 - Tdap) 8/15/1961 ZOSTER VACCINE AGE 60> 6/15/2000 GLAUCOMA SCREENING Q2Y 8/15/2005 Pneumococcal 65+ Low/Medium Risk (1 of 2 - PCV13) 8/15/2005 MEDICARE YEARLY EXAM 8/15/2005 HEMOGLOBIN A1C Q6M 6/4/2015 MICROALBUMIN Q1 12/4/2015 Influenza Age 5 to Adult 8/1/2017 LIPID PANEL Q1 11/29/2017 Allergies as of 2/26/2018  Review Complete On: 2/26/2018 By: Tiarra Vines MD  
  
 Severity Noted Reaction Type Reactions Fish Oil [Docosahexanoic Acid-epa]  02/04/2014    Rash, Itching Current Immunizations  Never Reviewed No immunizations on file. Not reviewed this visit You Were Diagnosed With   
  
 Codes Comments Essential hypertension, benign    -  Primary ICD-10-CM: I10 
ICD-9-CM: 401.1 2/18 controlled; 11/17 high, no meds so far today; advised to take exforge early am daily; Hypertrophic cardiomyopathy (HCC)     ICD-10-CM: I42.2 ICD-9-CM: 425.18 Improved LVH since HTN treated Pure hypercholesterolemia     ICD-10-CM: E78.00 ICD-9-CM: 272.0 11/16 LDL51; 
get from PCP Atherosclerosis of native coronary artery of native heart without angina pectoris     ICD-10-CM: I25.10 ICD-9-CM: 414.01 11/17 not on asa per GI;  likely does not have CAD Nonrheumatic aortic valve insufficiency     ICD-10-CM: I35.1 ICD-9-CM: 424.1 2/10; 1/14 mild AI 
  
 ICD (implantable cardioverter-defibrillator), dual, in situ     ICD-10-CM: Z95.810 ICD-9-CM: V45.02  2/18 nl function; wound healing well Vitals BP Pulse Height(growth percentile) Weight(growth percentile) BMI Smoking Status 135/64 (!) 51 5' 10\" (1.778 m) 177 lb (80.3 kg) 25.4 kg/m2 Former Smoker Vitals History BMI and BSA Data Body Mass Index Body Surface Area  
 25.4 kg/m 2 1.99 m 2 Preferred Pharmacy Pharmacy Name Phone [id number] [phone] Your Updated Medication List  
  
   
This list is accurate as of 2/26/18  9:33 AM.  Always use your most recent med list.  
  
  
  
  
 ACTOS 30 mg tablet Generic drug:  pioglitazone Take 30 mg by mouth daily. aspirin delayed-release 81 mg tablet Take  by mouth daily. atorvastatin 20 mg tablet Commonly known as:  LIPITOR Take 20 mg by mouth daily. carvedilol 25 mg tablet Commonly known as:  Laneta Rucks Take 25 mg by mouth two (2) times daily (with meals). dorzolamide 2 % ophthalmic solution Commonly known as:  TRUSOPT Administer 2 Drops to both eyes two (2) times a day. doxazosin 1 mg tablet Commonly known as:  CARDURA Take 1 Tab by mouth nightly. EXFORGE 5-320 mg per tablet Generic drug:  amLODIPine-valsartan Take 1 Tab by mouth daily. glimepiride 2 mg tablet Commonly known as:  AMARYL Take 2 mg by mouth two (2) times a day. HYDROcodone-acetaminophen  mg tablet Commonly known as:  Devan Silvius Take 1 Tab by mouth. JANUVIA 100 mg tablet Generic drug:  SITagliptin Take 100 mg by mouth daily. latanoprost 0.005 % ophthalmic solution Commonly known as:  Dupo Albert Administer 1 Drop to both eyes nightly. Follow-up Instructions Return in about 6 months (around 8/26/2018), or if symptoms worsen or fail to improve, for post test.  
  
To-Do List   
 Around 07/26/2018 Cardiac Services:  2D ECHO COMPLETE ADULT (TTE) Patient Instructions   
please get remote checks for pacer or ICD every 3 months and Office check in 1 yr Please call our office after every transmission to confirm success of transmission Medications Discontinued During This Encounter Medication Reason  amLODIPine-valsartan (EXFORGE)  mg per tablet Dose Adjustment  oxyCODONE-acetaminophen (PERCOCET) 5-325 mg per tablet Alternate Therapy Orders Placed This Encounter  2D ECHO COMPLETE ADULT (TTE) Standing Status:   Future Standing Expiration Date:   8/26/2018 Order Specific Question:   Reason for Exam: Answer:   AI Aortic Valve Regurgitation: Care Instructions Your Care Instructions The aortic valve works like a one-way gate. It opens so that blood can leave the heart and flow to the rest of the body. When the heart rests between beats, the aortic valve closes to keep blood from flowing backward into the heart. When the aortic valve does not close properly, some of the blood leaks back (regurgitates) through the valve into the heart. Then your heart has to work harder to pump blood throughout your body. You can have this condition for many years before it gets worse and you have symptoms. Your doctor will monitor your heart, and you may need to take medicines. If the disease becomes severe, you may need to have surgery to replace the valve. Follow-up care is a key part of your treatment and safety. Be sure to make and go to all appointments, and call your doctor if you are having problems. It's also a good idea to know your test results and keep a list of the medicines you take. How can you care for yourself at home? · Be safe with medicines. Take your medicines exactly as prescribed. Call your doctor if you think you are having a problem with your medicine. You will get more details on the specific medicines your doctor prescribes. · If you have an artificial valve, you may need to take antibiotics before you have certain dental or surgical procedures. The antibiotics help prevent an infection in your heart called endocarditis. · Eat heart-healthy foods such as fruits, vegetables, whole grains, fish, lean meats, and low-fat or nonfat dairy foods. Limit sodium, sugar, and alcohol. · Be active. Ask your doctor what type and level of exercise is safe for you. Walking is a good choice. You also may want to swim, bike, or do other activities. Talk with your doctor before doing strenuous activities or weightlifting. · Do not smoke. Smoking can make heart problems worse. If you need help quitting, talk to your doctor about stop-smoking programs and medicines. These can increase your chances of quitting for good. · Stay at a healthy weight. Lose weight if you need to. · Avoid colds and flu. Get a pneumococcal vaccine shot. If you have had one before, ask your doctor whether you need another dose. Get a flu vaccine every year. If you must be around people with colds or flu, wash your hands often. · Take care of your teeth and gums. Get regular dental checkups. Good dental health is important because bacteria can spread from infected teeth and gums to the heart valves. When should you call for help? Call 911 anytime you think you may need emergency care. For example, call if: 
? · You have signs of acute aortic valve regurgitation such as: ¨ Severe shortness of breath. ¨ A rapid heart rate. ¨ Lightheadedness. ? · You have symptoms of sudden heart failure such as: ¨ Severe trouble breathing. ¨ Coughing up pink, foamy mucus. ¨ A new irregular or rapid heartbeat. ?Call your doctor now or seek immediate medical care if: 
? · You have new or increased shortness of breath. ? · You are dizzy or lightheaded, or you feel like you may faint. ? · You have sudden weight gain, such as more than 2 to 3 pounds in a day or 5 pounds in a week. (Your doctor may suggest a different range of weight gain.) ? · You have increased swelling in your legs, ankles, or feet. ? · You are suddenly so tired or weak that you cannot do your usual activities. ? Watch closely for changes in your health, and be sure to contact your doctor if you have any problems. Where can you learn more? Go to http://rachel-ulisses.info/. Enter S168 in the search box to learn more about \"Aortic Valve Regurgitation: Care Instructions. \" Current as of: September 21, 2016 Content Version: 11.4 © 1721-9386 [employer].  Care instructions adapted under license by [employer] (which disclaims liability or warranty for this information). If you have questions about a medical condition or this instruction, always ask your healthcare professional. [address] any warranty or liability for your use of this information. Please provide this summary of care documentation to your next provider. Your primary care clinician is listed as [address] [place] [address]. If you have any questions after today's visit, please call [phone].

## 2018-02-26 NOTE — PROGRESS NOTES
HISTORY OF PRESENT ILLNESS  Kelly Sal is a 68 y.o. male. HPI Comments:   2/18 s/p gen change, doing well  Patient denies significant chest pain, SOB, palpitations, edema, dizziness  ICD box ok  Left leg claudication- s/p percut revasc early 2016  [facility] Follow Up   The history is provided by the patient and medical records (gen change). Pertinent negatives include no chest pain, no headaches and no shortness of breath. Cardiomyopathy   The history is provided by the medical records (improved LVH). Pertinent negatives include no chest pain, no headaches and no shortness of breath. Hypertension   The history is provided by the medical records. This is a chronic problem. Pertinent negatives include no chest pain, no headaches and no shortness of breath. Cholesterol Problem   The history is provided by the medical records. This is a chronic problem. Pertinent negatives include no chest pain, no headaches and no shortness of breath. Valvular Heart Disease   The history is provided by the medical records (mr/ai). Pertinent negatives include no chest pain, no headaches and no shortness of breath. Review of Systems   Constitutional: Negative for chills, fever, malaise/fatigue and weight loss. HENT: Negative for nosebleeds. Eyes: Negative for discharge. Respiratory: Negative for cough, shortness of breath and wheezing. Cardiovascular: Negative for chest pain, palpitations, orthopnea, claudication, leg swelling and PND. Gastrointestinal: Negative for diarrhea, nausea and vomiting. Genitourinary: Negative for dysuria and hematuria. Musculoskeletal: Negative for joint pain. Skin: Negative for rash. Neurological: Negative for dizziness, seizures, loss of consciousness and headaches. Endo/Heme/Allergies: Negative for polydipsia. Does not bruise/bleed easily. Psychiatric/Behavioral: Negative for depression and substance abuse. The patient does not have insomnia. Allergies   Allergen Reactions    Fish Oil [Docosahexanoic Acid-Epa] Rash and Itching       Past Medical History:   Diagnosis Date    Aortic valve disorders     2/10 mild AI    Automatic implantable cardiac defibrillator in situ     Needs remote check ASAP    Bone pain     CAD (coronary artery disease) 11/3/09    Cancer ([facility]     prostate    DDD (degenerative disc disease), lumbosacral     L5-S1    Diabetes [facility] ([facility]     Elevated prostate specific antigen (PSA) 3/10/08    Erectile dysfunction     Essential hypertension     H/O prostate cancer     Hypertension     Hypertrophic cardiomyopathy ([facility]     Improved LVH    Impotence of organic origin 3/10/08    Lower urinary tract symptoms (LUTS)     Nicotine addiction 11/3/09    Other and unspecified hyperlipidemia     PAD (peripheral artery disease) ([facility] 11/13/2015    add asa pending angio     Prostate cancer [facility])      s/p CRYO May 2011. TRUS bx showed T1a Lita 4+3, 60% one core    Skin ulcer (HCC)     SOBOE (shortness of breath on exertion)     Spinal stenosis     Stomach ulcer     Type II or unspecified type diabetes mellitus without mention of complication, not stated as uncontrolled     Controlled per wife    Unspecified sleep apnea     has CPAP    Wears glasses        Family History   Problem Relation Age of Onset    Heart Disease Father     Arthritis-osteo Other     Hypertension Other     Heart Attack Neg Hx     Sudden Death Neg Hx        Social History   Substance Use Topics    Smoking status: Former Smoker     Packs/day: 0.50     Years: 50.00     Quit date: 1/8/2012    Smokeless tobacco: Never Used    Alcohol use Yes      Comment: very rare        Current Outpatient Prescriptions   Medication Sig    aspirin delayed-release 81 mg tablet Take  by mouth daily.  amLODIPine-valsartan (EXFORGE) 5-320 mg per tablet Take 1 Tab by mouth daily.  HYDROcodone-acetaminophen (NORCO)  mg tablet Take 1 Tab by mouth.  doxazosin (CARDURA) 1 mg tablet Take 1 Tab by mouth nightly.  pioglitazone (ACTOS) 30 mg tablet Take 30 mg by mouth daily.  dorzolamide (TRUSOPT) 2 % ophthalmic solution Administer 2 Drops to both eyes two (2) times a day.  latanoprost (XALATAN) 0.005 % ophthalmic solution Administer 1 Drop to both eyes nightly.  glimepiride (AMARYL) 2 mg tablet Take 2 mg by mouth two (2) times a day.  sitaGLIPtin (JANUVIA) 100 mg tablet Take 100 mg by mouth daily.  atorvastatin (LIPITOR) 20 mg tablet Take 20 mg by mouth daily.  carvedilol (COREG) 25 mg tablet Take 25 mg by mouth two (2) times daily (with meals). No current facility-administered medications for this visit. Past Surgical History:   Procedure Laterality Date    HX CARPAL TUNNEL RELEASE      HX ENDOSCOPY      HX IMPLANTABLE CARDIOVERTER DEFIBRILLATOR      HX KNEE REPLACEMENT  8/98    HX KNEE REPLACEMENT Right 08/06/2013    [facility], total knee replacement     HX KNEE REPLACEMENT Left 1998    HX KNEE REPLACEMENT Left 2014    [employer]    HX KNEE REPLACEMENT Right 2014    HX ORTHOPAEDIC      HX PACEMAKER      [employer]    HX PROSTATECTOMY  6/30/11    HX UROLOGICAL      [facility].  Dr. Rimma Helm, Cryo     [place] COLONOSCOPY FLX DX W/COLLJ [facility]IENT [facility] WHEN PFRMD         Diagnostic Studies:  CARDIOLOGY STUDIES 9/10/2014 1/10/2014 9/25/2012 2/21/2011 2/15/2010 3/11/2009 12/5/2005   ICD / Pacemaker Check Result - - (redone due to pt call with pain next to ICD site) nl function nl function incr vol per optivol - - -   Myocardial Perfusion Scan Result - - - - - - abn scan, fixed inferior wall defect, EF 52%   Pharmacological Nuclear Stress Test Result nl scan, nl EF - - - - - -   Echocardiogram - Complete Result - 65%EF, mild DD, tr MR, mild AI - - EF 65-70%, mod DD, trace MR, mild AI, PAP 48; EF 65%, mild DD, mild MR, mild AI EF 65-70%, mod DD, mild MR   Some recent data might be hidden       Visit Vitals    /64    Pulse (!) 51    Ht 5' 10\" (1.778 m)    Wt 177 lb (80.3 kg)    BMI 25.4 kg/m2       Mr. Marta Waite has a reminder for a \"due or due soon\" health maintenance. I have asked that he contact his primary care provider for follow-up on this health maintenance. Physical Exam   Constitutional: He is oriented to person, place, and time. He appears well-developed and well-nourished. No distress. HENT:   Head: Normocephalic and atraumatic. Eyes: Right eye exhibits no discharge. Left eye exhibits no discharge. No scleral icterus. Neck: Neck supple. No JVD present. Carotid bruit is not present. No thyromegaly present. Cardiovascular: Normal rate, regular rhythm, S1 normal, S2 normal and intact distal pulses. Exam reveals distant heart sounds. Exam reveals no gallop and no friction rub. No murmur heard. Pulmonary/Chest: Effort normal and breath sounds normal. He has no wheezes. He has no rales. Normal position & motion of ICD box   Abdominal: Soft. He exhibits no mass. There is no tenderness. Musculoskeletal: He exhibits no edema. Neurological: He is alert and oriented to person, place, and time. Skin: Skin is warm and dry. No rash noted. Psychiatric: He has a normal mood and affect. His behavior is normal.       ASSESSMENT and PLAN            Diagnoses and all orders for this visit:    1. Essential hypertension, benign  Comments:  2/18 controlled; 11/17 high, no meds so far today; advised to take exforge early am daily;    2. Hypertrophic cardiomyopathy (HCC)  Comments:  Improved LVH since HTN treated    Orders:  -     2D ECHO COMPLETE ADULT (TTE); Future    3. Pure hypercholesterolemia  Comments:  11/16 LDL51;  get from PCP    4. Atherosclerosis of native coronary artery of native heart without angina pectoris  Comments:  11/17 not on asa per GI;  likely does not have CAD    5. Nonrheumatic aortic valve insufficiency  Comments:  2/10; 1/14 mild AI    Orders:  -     2D ECHO COMPLETE ADULT (TTE); Future    6.  ICD (implantable cardioverter-defibrillator), dual, in situ  Comments:   2/18 nl function; wound healing well        Pertinent laboratory and test data reviewed and discussed with patient.   See patient instructions also for other medical advice given    Medications Discontinued During This Encounter   Medication Reason    amLODIPine-valsartan (EXFORGE)  mg per tablet Dose Adjustment    oxyCODONE-acetaminophen (PERCOCET) 5-325 mg per tablet Alternate Therapy       Follow-up Disposition:  Return in about 6 months (around 8/26/2018), or if symptoms worsen or fail to improve, for post test.

## 2018-03-10 DIAGNOSIS — I10 ESSENTIAL HYPERTENSION: ICD-10-CM

## 2018-03-12 RX ORDER — DOXAZOSIN 1 MG/1
TABLET ORAL
Qty: 30 TAB | Refills: 6 | Status: SHIPPED | OUTPATIENT
Start: 2018-03-12 | End: 2018-10-01 | Stop reason: SDUPTHER

## 2018-05-01 ENCOUNTER — OFFICE VISIT (OUTPATIENT)
Dept: VASCULAR SURGERY | Age: 78
End: 2018-05-01

## 2018-05-01 DIAGNOSIS — I70.213 ATHEROSCLEROSIS OF NATIVE ARTERY OF BOTH LOWER EXTREMITIES WITH INTERMITTENT CLAUDICATION (HCC): ICD-10-CM

## 2018-05-01 NOTE — PROCEDURES
[facility]  *** FINAL REPORT ***    Name: Antonio Perez  MRN: [id number]       Outpatient  : 15 Aug 1940  HIS Order #: [id number]  [address] [facility] [place] [facility] Visit #: [id number]  Date: 01 May 2018    TYPE OF TEST: Peripheral Arterial Testing    REASON FOR TEST  Peripheral vascular dz NOS    Right Leg  Segmentals: Abnormal                     mmHg  Brachial         135  High thigh  Low thigh  Calf             118  Posterior tibial 110  Dorsalis pedis   102  Peroneal  Metatarsal  Toe pressure      74  Doppler:    Normal  Ankle/Brachial: 0.81    Left Leg  Segmentals: Abnormal                     mmHg  Brachial         135  High thigh  Low thigh  Calf             107  Posterior tibial 108  Dorsalis pedis   108  Peroneal  Metatarsal  Toe pressure      79  Doppler:    Normal  Ankle/Brachial: 0.80  Post exercise results:  Speed: 1.5  mph  Grade: 12  %  Duration: 3.5 MINS     Brachial  Right Ankle  JHONATAN    Left Ankle  JHONATAN    1:   159         66      0.42       88      0.55  2:   152         118     0.78       118     0.78  3:  4:  5:    INTERPRETATION/FINDINGS  Physiologic testing was performed using continuous wave doppler and  segmental pressures. 1. Moderate arterial insufficiency on the right at rest, level  undetermined. 2. Moderate arterial insufficiency on the left at rest, level  undetermined. 3. The JHONATAN on the right is 0.81 and on the left is 0.80. 4. Limited treadmill exercise showed no significant arterial inflow  disease bilaterally. Significant drop in the ankle brachial index  bilaterally with exercise, from 0.81 to 0.42 in the right leg and from   0.80 to 0.55 in the left leg, consistent with bilateral severe  arterial occlusive disease. As compared with previous studies, there is evidence of disease  progression bilaterally. ADDITIONAL COMMENTS    I have personally reviewed the data relevant to the interpretation of  this  study. TECHNOLOGIST: Yvonne Burr RVT, BS  Signed: 2018 04:22 PM    PHYSICIAN: Bianca Hall.  Ernesto Velasco MD  Signed: 05/02/2018 01:34 PM

## 2018-05-10 ENCOUNTER — OFFICE VISIT (OUTPATIENT)
Dept: VASCULAR SURGERY | Age: 78
End: 2018-05-10

## 2018-05-10 VITALS
SYSTOLIC BLOOD PRESSURE: 144 MMHG | WEIGHT: 177 LBS | DIASTOLIC BLOOD PRESSURE: 68 MMHG | HEIGHT: 70 IN | BODY MASS INDEX: 25.34 KG/M2

## 2018-05-10 DIAGNOSIS — I73.9 PAD (PERIPHERAL ARTERY DISEASE) (HCC): ICD-10-CM

## 2018-05-10 DIAGNOSIS — I70.213 ATHEROSCLEROSIS OF NATIVE ARTERY OF BOTH LOWER EXTREMITIES WITH INTERMITTENT CLAUDICATION (HCC): ICD-10-CM

## 2018-05-10 DIAGNOSIS — M25.552 LEFT HIP PAIN: Primary | ICD-10-CM

## 2018-05-10 NOTE — PROGRESS NOTES
Mr Nancy Miller is here for follow up after he had left leg SFA atherectomy and balloon angioplasty nearly a two and a half years ago for claudication symptoms of the left leg. Had been primarily described as pain that would occur just with walking, in the left groin but with radiation to the thigh and also in the calf. After his initial procedure he had good resolution of symptoms. We saw him last fall he did start describing some intermittent familiar symptoms. But he felt it was not as severe as it had been prior to his procedure, so we agreed to just do surveillance. He had continued on aspirin and Plavix but his cardiologist recently discontinued these. He is with his wife today, who states he just doesn't walk or do much  I asked if leg pain was his limitation and he acknowledged that it probably is    Though he still has what sounds like some calf claudication, he again feels that the left groin/hip is the source of most of his pain  He says it can be painful as soon as he stands and he just won't do much because of the pain. It will radiate down the leg as well. At times he also says the hip feels like it gives out on him and it is all he can do to not fall    I stated that I was concerned this may be more musculoskeletal in nature  He is established with ortho because of his knees, so I suggested he be seen to evaluate his hip    I did mention changes in his vascular studies as well. He has gone from normal perfusion to mild/moderate disease  Certainly this isn't something that requires urgent intervention and hard to associate that it would be causing this crippling groin pain, as claudication would be most remarkable symptom and based on level of disease, affect the distal extremity anyway. I suggested we could still certainly plan on another angiogram/intervention. However, if he would need anything done with hip, which may perhaps be the priority, he would have to be off of any blood thinner.  I have to clarify the purpose of discontinuation of the plavix/aspirin, as he suggested issues with GI. If we were to do an intervention, we would suggest plavix, and I would have to ensure he could take it. If so, and we did intervention and place on plavix, this would delay any ortho procedure if needed.  Therefore, since his disease is not severe/critical, I would suggest ortho eval/treatment, followed by angiogram. I will contact him after his ortho visit on 5/31 to inquire of that plan and arrange our follow up accordingly    Total time spent in discussion was 15 minutes

## 2018-05-10 NOTE — MR AVS SNAPSHOT
[address] [facility] [address] 
 
 
 [address] [address] [address] [facility] 
[phone] Patient: Harish Shelton MRN: [id number] TPN:[id number] Visit Information Date & Time Provider Department Dept. Phone Encounter #  
 5/10/2018  9:45 AM Renetta Marx, 82 [facility] [phone] [id number] Your Appointments 7/27/2018 11:30 AM  
PROCEDURE with [place] ECHO [facility] Appt Note: [address] [facility], [address] [facility] [address] [phone]  
  
   
 [address] [facility], [address] [facility]  
  
    
 8/27/2018  9:30 AM  
Office Visit with Bharath Carlson MD  
[facility]) Appt Note: 6 months post echo [address] [facility], [address] [facility] [address]  
[address] [facility] [place], [address] [place] [address] Upcoming Health Maintenance Date Due  
 FOOT EXAM Q1 8/15/1950 EYE EXAM RETINAL OR DILATED Q1 8/15/1950 DTaP/Tdap/Td series (1 - Tdap) 8/15/1961 ZOSTER VACCINE AGE 60> 6/15/2000 GLAUCOMA SCREENING Q2Y 8/15/2005 Pneumococcal 65+ Low/Medium Risk (1 of 2 - PCV13) 8/15/2005 HEMOGLOBIN A1C Q6M 6/4/2015 MICROALBUMIN Q1 12/4/2015 LIPID PANEL Q1 11/29/2017 MEDICARE YEARLY EXAM 3/14/2018 Influenza Age 5 to Adult 8/1/2018 Allergies as of 5/10/2018  Review Complete On: 2/26/2018 By: Bharath Carlson MD  
  
 Severity Noted Reaction Type Reactions Fish Oil [Docosahexanoic Acid-epa]  02/04/2014    Rash, Itching Current Immunizations  Never Reviewed No immunizations on file. Not reviewed this visit You Were Diagnosed With   
  
 Codes Comments Left hip pain    -  Primary ICD-10-CM: W10.041 ICD-9-CM: 719.45 PAD (peripheral artery disease) (HCC)     ICD-10-CM: I73.9 ICD-9-CM: 443. 9 Vitals  BP Height(growth percentile) Weight(growth percentile) BMI Smoking Status 144/68 (BP 1 Location: Right arm, BP Patient Position: Sitting) 5' 10\" (1.778 m) 177 lb (80.3 kg) 25.4 kg/m2 Former Smoker BMI and BSA Data Body Mass Index Body Surface Area  
 25.4 kg/m 2 1.99 m 2 Preferred Pharmacy Pharmacy Name Phone [facility][id number]- [address] [phone] Your Updated Medication List  
  
   
This list is accurate as of 5/10/18 10:13 AM.  Always use your most recent med list.  
  
  
  
  
 ACTOS 30 mg tablet Generic drug:  pioglitazone Take 30 mg by mouth daily. aspirin delayed-release 81 mg tablet Take  by mouth daily. atorvastatin 20 mg tablet Commonly known as:  LIPITOR Take 20 mg by mouth daily. carvedilol 25 mg tablet Commonly known as:  Shilpi Alstrom Take 25 mg by mouth two (2) times daily (with meals). dorzolamide 2 % ophthalmic solution Commonly known as:  TRUSOPT Administer 2 Drops to both eyes two (2) times a day. doxazosin 1 mg tablet Commonly known as:  CARDURA TAKE 1 TABLET BY MOUTH NIGHTLY. EXFORGE 5-320 mg per tablet Generic drug:  amLODIPine-valsartan Take 1 Tab by mouth daily. glimepiride 2 mg tablet Commonly known as:  AMARYL Take 2 mg by mouth two (2) times a day. HYDROcodone-acetaminophen  mg tablet Commonly known as:  Stan Quirk Take 1 Tab by mouth. JANUVIA 100 mg tablet Generic drug:  SITagliptin Take 100 mg by mouth daily. latanoprost 0.005 % ophthalmic solution Commonly known as:  Roberta Desai Administer 1 Drop to both eyes nightly. We Performed the Following REFERRAL TO ORTHOPEDIC SURGERY [REF62 Custom] Comments:  
 Please evaluate patient for left hip/groin pain Referral Information Referral ID Referred By Referred To  
  
 [id number] [facility]   
   [address] [place] [address] Sarah Mcdaniel, [address] Phone: [phone] Fax: [phone] Visits Status Start Date End Date 1 New Request 5/10/18 5/10/19 If your referral has a status of pending review or denied, additional information will be sent to support the outcome of this decision. Please provide this summary of care documentation to your next provider. Your primary care clinician is listed as Imelda Moreland. If you have any questions after today's visit, please call [phone].

## 2018-05-31 ENCOUNTER — OFFICE VISIT (OUTPATIENT)
Dept: ORTHOPEDIC SURGERY | Facility: CLINIC | Age: 78
End: 2018-05-31

## 2018-05-31 VITALS
WEIGHT: 178.6 LBS | DIASTOLIC BLOOD PRESSURE: 63 MMHG | HEIGHT: 70 IN | TEMPERATURE: 97.5 F | HEART RATE: 57 BPM | BODY MASS INDEX: 25.57 KG/M2 | SYSTOLIC BLOOD PRESSURE: 142 MMHG | OXYGEN SATURATION: 97 % | RESPIRATION RATE: 16 BRPM

## 2018-05-31 DIAGNOSIS — M25.552 LEFT HIP PAIN: ICD-10-CM

## 2018-05-31 DIAGNOSIS — Z96.653 HISTORY OF BILATERAL KNEE REPLACEMENT: Primary | ICD-10-CM

## 2018-05-31 DIAGNOSIS — M54.5 CHRONIC BILATERAL LOW BACK PAIN, WITH SCIATICA PRESENCE UNSPECIFIED: ICD-10-CM

## 2018-05-31 DIAGNOSIS — G89.29 CHRONIC BILATERAL LOW BACK PAIN, WITH SCIATICA PRESENCE UNSPECIFIED: ICD-10-CM

## 2018-05-31 PROBLEM — E11.21 TYPE 2 DIABETES WITH NEPHROPATHY (HCC): Status: ACTIVE | Noted: 2018-05-31

## 2018-05-31 NOTE — PROGRESS NOTES
Patient: Yvrose Alejandra                MRN: [id number]       SSN: [id number]  YOB: 1940        AGE: 68 y.o. SEX: male  Body mass index is 25.63 kg/(m^2). PCP: Shanthi Victor MD  05/31/18  HISTORY: I had the pleasure of reviewing Ray Limes. He is having problems with his left groin area. He will have a few days where things are fine and other days the groin pain is enough that it makes him sit down. He denies catching or locking. It is definitely groin. He denies much in the way of back pain. He denies much in the way of radiculopathy, although sometimes, if he stands for a while, he will have to go sit down for some radicular pain that goes down the lateral aspect of the hip. There is not too much pain rolling over on it at night. He has had a Pacemaker placed and has had heart surgery. Both knee replacements are doing wonderfully. PHYSICAL EXAMINATION:  On examination today, both hips actually rotate quite well. With flexion, adduction, and internal rotation, there is a little bit of discomfort in the groin, but not severely so. The trochanter is not particularly tender. The low back is only mildly tender. He has mild evidence of neuropathy, although both feet are warm and well perfused. RADIOGRAPHS:  Review of his x-rays, AP of the pelvis and AP and lateral of the hip, shows some mild dysplasia. I do not see an obvious fracture. PLAN:  At this point in time, I would prefer an MRI, but he cannot have one. Therefore, I will obtain a CT scan with contrast to have a look at the hip. We may eventually get a CT scan of the lumbar spine as well at a later point. He is going to be moving up to [place]. We will miss him. I will see him back in the next week or so to try to get this sorted out. Of course, we will help transition his medical records when he finds a new physician up [facility]. We will miss him. He has been a terrific patient.             REVIEW OF SYSTEMS:      CON: negative for weight loss, fever  EYE: negative for double vision  ENT: negative for hoarseness  RS:   negative for Tb  GI:    negative for blood in stool  :  negative for blood in urine  Other systems reviewed and noted below. Past Medical History:   Diagnosis Date    Aortic valve disorders     2/10 mild AI    Automatic implantable cardiac defibrillator in situ     Needs remote check ASAP    Bone pain     CAD (coronary artery disease) 11/3/09    Cancer ([facility]     prostate    DDD (degenerative disc disease), lumbosacral     L5-S1    Diabetes mellitus ([address]     Elevated prostate specific antigen (PSA) 3/10/08    Erectile dysfunction     Essential hypertension     H/O prostate cancer     Hypertension     Hypertrophic cardiomyopathy ([facility]     Improved LVH    Impotence of organic origin 3/10/08    Lower urinary tract symptoms (LUTS)     Nicotine addiction 11/3/09    Other and unspecified hyperlipidemia     PAD (peripheral artery disease) ([facility] 11/13/2015    add asa pending angio     Prostate cancer [facility])      s/p CRYO May 2011. TRUS bx showed T1a [place] 4+3, 60% one core    Skin ulcer ([facility]     SOBOE (shortness of breath on exertion)     Spinal stenosis     Stomach ulcer     Type II or unspecified type diabetes mellitus without mention of complication, not stated as uncontrolled     Controlled per wife    Unspecified sleep apnea     has CPAP    Wears glasses        Family History   Problem Relation Age of Onset    Heart Disease Father     Arthritis-osteo Other     Hypertension Other     Heart Attack Neg Hx     Sudden Death Neg Hx        Current Outpatient Prescriptions   Medication Sig Dispense Refill    doxazosin (CARDURA) 1 mg tablet TAKE 1 TABLET BY MOUTH NIGHTLY. 30 Tab 6    aspirin delayed-release 81 mg tablet Take  by mouth daily.  amLODIPine-valsartan (EXFORGE) 5-320 mg per tablet Take 1 Tab by mouth daily.       pioglitazone (ACTOS) 30 mg tablet Take 30 mg by mouth daily.  dorzolamide (TRUSOPT) 2 % ophthalmic solution Administer 2 Drops to both eyes two (2) times a day.  latanoprost (XALATAN) 0.005 % ophthalmic solution Administer 1 Drop to both eyes nightly.  glimepiride (AMARYL) 2 mg tablet Take 2 mg by mouth two (2) times a day.  sitaGLIPtin (JANUVIA) 100 mg tablet Take 100 mg by mouth daily.  atorvastatin (LIPITOR) 20 mg tablet Take 20 mg by mouth daily.  carvedilol (COREG) 25 mg tablet Take 25 mg by mouth two (2) times daily (with meals).  HYDROcodone-acetaminophen (NORCO)  mg tablet Take 1 Tab by mouth. Allergies   Allergen Reactions    Fish Oil [Docosahexanoic Acid-Epa] Rash and Itching       Past Surgical History:   Procedure Laterality Date    HX CARPAL TUNNEL RELEASE      HX ENDOSCOPY      HX IMPLANTABLE CARDIOVERTER DEFIBRILLATOR      HX KNEE REPLACEMENT  8/98    HX KNEE REPLACEMENT Right 08/06/2013    [facility] total knee replacement     HX KNEE REPLACEMENT Left 1998    HX KNEE REPLACEMENT Left 2014    [employer]    HX KNEE REPLACEMENT Right 2014    HX ORTHOPAEDIC      HX PACEMAKER      [employer]    HX PROSTATECTOMY  6/30/11    HX UROLOGICAL      [facility]. Dr. Owen Lemus, Cryo     [place] COLONOSCOPY FLX DX W/COLLJ [facility]PAT[facility] WHEN PFRMD         Social History     Social History    Marital status:      Spouse name: N/A    Number of children: N/A    Years of education: N/A     Occupational History    Not on file.      Social History Main Topics    Smoking status: Former Smoker     Packs/day: 0.50     Years: 50.00     Quit date: 1/8/2012    Smokeless tobacco: Never Used    Alcohol use Yes      Comment: very rare    Drug use: No    Sexual activity: Not on file     Other Topics Concern    Not on file     Social History Narrative       Visit Vitals    /63    Pulse (!) 57    Temp 97.5 °F (36.4 °C) (Oral)    Resp 16    Ht 5' 10\" (1.778 m)    Wt 178 lb 9.6 oz (81 kg)    SpO2 97%    BMI 25.63 kg/m2         PHYSICAL EXAMINATION:  GENERAL: Alert and oriented x3, in no acute distress, well-developed, well-nourished, afebrile. HEART: No JVD. EYES: No scleral icterus   NECK: No significant lymphadenopathy   LUNGS: No respiratory compromise or indrawing  ABDOMEN: Soft, non-tender, non-distended. Electronically signed by:  Barney Flood MD

## 2018-06-11 ENCOUNTER — HOSPITAL ENCOUNTER (OUTPATIENT)
Dept: CT IMAGING | Age: 78
Discharge: HOME | End: 2018-06-11
Attending: ORTHOPAEDIC SURGERY
Payer: MEDICARE

## 2018-06-11 DIAGNOSIS — M25.552 LEFT HIP PAIN: ICD-10-CM

## 2018-06-11 DIAGNOSIS — G89.29 CHRONIC BILATERAL LOW BACK PAIN, WITH SCIATICA PRESENCE UNSPECIFIED: ICD-10-CM

## 2018-06-11 DIAGNOSIS — M54.5 CHRONIC BILATERAL LOW BACK PAIN, WITH SCIATICA PRESENCE UNSPECIFIED: ICD-10-CM

## 2018-06-11 LAB — CREAT UR-MCNC: 1.8 MG/DL (ref 0.6–1.3)

## 2018-06-11 PROCEDURE — 72131 CT LUMBAR SPINE W/O DYE: CPT

## 2018-06-11 PROCEDURE — 73701 CT LOWER EXTREMITY W/DYE: CPT

## 2018-06-11 PROCEDURE — 74011636320 HC RX REV CODE- 636/320: Performed by: ORTHOPAEDIC SURGERY

## 2018-06-11 PROCEDURE — 82565 ASSAY OF CREATININE: CPT

## 2018-06-11 RX ADMIN — IOPAMIDOL 70 ML: 612 INJECTION, SOLUTION INTRAVENOUS at 12:07

## 2018-06-14 ENCOUNTER — OFFICE VISIT (OUTPATIENT)
Dept: ORTHOPEDIC SURGERY | Age: 78
End: 2018-06-14

## 2018-06-14 VITALS
OXYGEN SATURATION: 96 % | DIASTOLIC BLOOD PRESSURE: 72 MMHG | RESPIRATION RATE: 16 BRPM | HEIGHT: 70 IN | TEMPERATURE: 98.1 F | SYSTOLIC BLOOD PRESSURE: 130 MMHG | HEART RATE: 61 BPM | WEIGHT: 179 LBS | BODY MASS INDEX: 25.62 KG/M2

## 2018-06-14 DIAGNOSIS — M25.552 LEFT HIP PAIN: ICD-10-CM

## 2018-06-14 DIAGNOSIS — M54.5 CHRONIC BILATERAL LOW BACK PAIN, WITH SCIATICA PRESENCE UNSPECIFIED: Primary | ICD-10-CM

## 2018-06-14 DIAGNOSIS — G89.29 CHRONIC BILATERAL LOW BACK PAIN, WITH SCIATICA PRESENCE UNSPECIFIED: Primary | ICD-10-CM

## 2018-06-14 NOTE — PROGRESS NOTES
[address], [address] [facility]  [phone]           Patient: Esvin Dobbs                MRN: [id number]       SSN: [id number]  YOB: 1940        AGE: 68 y.o. SEX: male  Body mass index is 25.68 kg/(m^2). PCP: Mukesh Diaz MD  06/14/18      This office note has been dictated. REVIEW OF SYSTEMS:  Constitutional: Negative for fever, chills, weight loss and malaise/fatigue. HENT: Negative. Eyes: Negative. Respiratory: Negative. Cardiovascular: Negative. Gastrointestinal: No bowel incontinence or constipation. Genitourinary: No bladder incontinence or saddle anesthesia. Skin: Negative. Neurological: Negative. Endo/Heme/Allergies: Negative. Psychiatric/Behavioral: Negative. Musculoskeletal: As per HPI above. Past Medical History:   Diagnosis Date    Aortic valve disorders     2/10 mild AI    Automatic implantable cardiac defibrillator in situ     Needs remote check ASAP    Bone pain     CAD (coronary artery disease) 11/3/09    Cancer ([address]     prostate    DDD (degenerative disc disease), lumbosacral     L5-S1    Diabetes mellitus ([address]     Elevated prostate specific antigen (PSA) 3/10/08    Erectile dysfunction     Essential hypertension     H/O prostate cancer     Hypertension     Hypertrophic cardiomyopathy ([address]     Improved LVH    Impotence of organic origin 3/10/08    Lower urinary tract symptoms (LUTS)     Nicotine addiction 11/3/09    Other and unspecified hyperlipidemia     PAD (peripheral artery disease) ([address] 11/13/2015    add asa pending angio     Prostate cancer [facility])      s/p CRYO May 2011.   TRUS bx showed T1a [place] 4+3, 60% one core    Skin ulcer ([address]     SOBOE (shortness of breath on exertion)     Spinal stenosis     Stomach ulcer     Type II or unspecified type diabetes mellitus without mention of complication, not stated as uncontrolled     Controlled per wife    Unspecified sleep apnea     has CPAP    Wears glasses          Current Outpatient Prescriptions:     doxazosin (CARDURA) 1 mg tablet, TAKE 1 TABLET BY MOUTH NIGHTLY., Disp: 30 Tab, Rfl: 6    amLODIPine-valsartan (EXFORGE) 5-320 mg per tablet, Take 1 Tab by mouth daily. , Disp: , Rfl:     pioglitazone (ACTOS) 30 mg tablet, Take 30 mg by mouth daily. , Disp: , Rfl:     dorzolamide (TRUSOPT) 2 % ophthalmic solution, Administer 2 Drops to both eyes two (2) times a day., Disp: , Rfl:     latanoprost (XALATAN) 0.005 % ophthalmic solution, Administer 1 Drop to both eyes nightly., Disp: , Rfl:     glimepiride (AMARYL) 2 mg tablet, Take 2 mg by mouth two (2) times a day., Disp: , Rfl:     sitaGLIPtin (JANUVIA) 100 mg tablet, Take 100 mg by mouth daily. , Disp: , Rfl:     atorvastatin (LIPITOR) 20 mg tablet, Take 20 mg by mouth daily. , Disp: , Rfl:     carvedilol (COREG) 25 mg tablet, Take 25 mg by mouth two (2) times daily (with meals). , Disp: , Rfl:     aspirin delayed-release 81 mg tablet, Take  by mouth daily. , Disp: , Rfl:     HYDROcodone-acetaminophen (NORCO)  mg tablet, Take 1 Tab by mouth., Disp: , Rfl:     Allergies   Allergen Reactions    Fish Oil [Docosahexanoic Acid-Epa] Rash and Itching       Social History     Social History    Marital status:      Spouse name: N/A    Number of children: N/A    Years of education: N/A     Occupational History    Not on file.      Social History Main Topics    Smoking status: Former Smoker     Packs/day: 0.50     Years: 50.00     Quit date: 1/8/2012    Smokeless tobacco: Never Used    Alcohol use Yes      Comment: very rare    Drug use: No    Sexual activity: Not on file     Other Topics Concern    Not on file     Social History Narrative       Past Surgical History:   Procedure Laterality Date    HX CARPAL TUNNEL RELEASE      HX ENDOSCOPY      HX IMPLANTABLE CARDIOVERTER DEFIBRILLATOR      HX KNEE REPLACEMENT  8/98    HX KNEE REPLACEMENT Right 08/06/2013    [facility], total knee replacement     HX KNEE REPLACEMENT Left 1998    HX KNEE REPLACEMENT Left 2014    [facility]    HX KNEE REPLACEMENT Right 2014    HX ORTHOPAEDIC      HX PACEMAKER      [employer]    HX PROSTATECTOMY  6/30/11    HX UROLOGICAL      [facility]. Dr. Josette Michaels, Cryo     [place] COLONOSCOPY FLX DX W/COLLJ [address] WHEN PFRMD             SUBJECTIVE:   We did see Mr. Gigi Croft for followup in regard complaints of groin discomfort intermittently. The patient states he has not had any discomfort over the past month. It is typically when he is up and ambulating for long periods of time. He will get discomfort into the hip joint, mainly on the left side but also on the right. He reports of pain will radiated around to his buttocks and sometimes down his leg. He went for a CT scan of his left hip as well as his lumbar spine. He presents today for re-evaluation. No recent fevers or chills, systemic changes. No injuries to report. No chest pain or shortness of breath. No change in bowel or bladder habits. PHYSICAL EXAMINATION: In general, the patient is alert and oriented x3 and is in no acute distress. The patient is well-developed, well-nourished, and afebrile. HEENT:  Head is normocephalic and atraumatic. Neck is supple. Trachea is midline. No JVD is present. Breathing is nonlabored. Examination of lower extremities reveals pain-free range of motion of the hips. There is no pain to palpation to the trochanteric bursa. Negative straight leg raise. Negative calf tenderness. No Homans. No signs of DVT present. There is no catching or locking noted with the hip itself. IMAGING: Review of the CT scan of the left hip shows no evidence for AVN of the left hip. There is mild-to-moderate arthritis of the left hip joint. Review of the CT scan results of the lumbar spine shows multilevel degenerative changes.  L5-S1, severe degenerative disk disease, mild central stenosis, mild-to-moderate lateral stenosis bilaterally. L4-5, moderate degenerative disk disease, moderate-to-marked disk protrusion, bulging disk, left posterior, moderate severe stenosis, central canal, moderate left lateral recess stenosis, mild right neural foraminal stenosis noted as well. ASSESSMENT:    1. Left hip mild-to-moderate osteoarthritis. 2. Lumbar degenerative disease with radiculopathy. PLAN:  At this point, the patient has an upcoming move to [place]. He has signed a medical release form to obtain his records. I have asked him to give an orthopedic surgeon to follow his knee replacements as well as spine surgeon to evaluate his lumbar spine. Fortunately, he is not symptomatic at this point in time.  He will call with any questions or if concerns arise       CC:  MD Jeff Dykes, PA-C, ATC

## 2018-07-18 ENCOUNTER — DOCUMENTATION ONLY (OUTPATIENT)
Dept: VASCULAR SURGERY | Age: 78
End: 2018-07-18

## 2018-07-18 NOTE — PROGRESS NOTES
Since this patient's visit in May, I had attempted to call a couple of times to get feedback after some visits that he was having with orthopedics so we can determine how to follow-up regarding his PAD. I have been unable to reach him. In fact my last attempt to contact him today the number that we have on file has been disconnected. I do note in one of the orthopedic notes that he was planning a move to [place].   We will be happy to provide records and recommendations if he should call back needing anything as he transitions to new caretakers at his new residence

## 2018-07-23 RX ORDER — AMLODIPINE AND VALSARTAN 10; 320 MG/1; MG/1
TABLET ORAL
Qty: 90 TAB | Refills: 1 | Status: SHIPPED | OUTPATIENT
Start: 2018-07-23 | End: 2019-01-19 | Stop reason: SDUPTHER

## 2018-10-01 DIAGNOSIS — I10 ESSENTIAL HYPERTENSION: ICD-10-CM

## 2018-10-01 RX ORDER — DOXAZOSIN 1 MG/1
TABLET ORAL
Qty: 30 TAB | Refills: 2 | Status: SHIPPED | OUTPATIENT
Start: 2018-10-01 | End: 2018-12-17 | Stop reason: SDUPTHER

## 2018-12-17 DIAGNOSIS — I10 ESSENTIAL HYPERTENSION: ICD-10-CM

## 2018-12-17 RX ORDER — DOXAZOSIN 1 MG/1
TABLET ORAL
Qty: 30 TAB | Refills: 2 | Status: SHIPPED | OUTPATIENT
Start: 2018-12-17

## 2019-01-21 RX ORDER — AMLODIPINE AND VALSARTAN 10; 320 MG/1; MG/1
TABLET ORAL
Qty: 90 TAB | Refills: 1 | Status: SHIPPED | OUTPATIENT
Start: 2019-01-21

## 2019-03-23 DIAGNOSIS — I10 ESSENTIAL HYPERTENSION: ICD-10-CM

## 2019-03-24 RX ORDER — DOXAZOSIN 1 MG/1
TABLET ORAL
Qty: 30 TAB | Refills: 2 | OUTPATIENT
Start: 2019-03-24

## 2019-07-20 NOTE — LETTER
We had an appointment reserved for you on 8/27/2018  and were concerned when you did not show or call within 24 hours to cancel the appointment. Our policy is to call patients a day prior to their appointment to remind them of the date and time. We perform these calls as a courtesy to our patients and to allow us the opportunity to rebook the time slot should the appointment not be necessary. We have received a medication refill request from your pharmacy but we have not seen you in a while. Please call our office to make an appointment with your provider, unless another provider is refilling your medications. Thank you for your anticipated cooperation The scheduling staff: 
 
[facility] [address]. [address] [facility]

## 2019-07-22 RX ORDER — AMLODIPINE AND VALSARTAN 10; 320 MG/1; MG/1
TABLET ORAL
Qty: 90 TAB | Refills: 1 | OUTPATIENT
Start: 2019-07-22

## 2021-10-14 ENCOUNTER — OUTPATIENT (OUTPATIENT)
Dept: OUTPATIENT SERVICES | Facility: HOSPITAL | Age: 81
LOS: 1 days | End: 2021-10-14
Payer: MEDICARE

## 2021-10-14 VITALS
TEMPERATURE: 97 F | HEART RATE: 57 BPM | OXYGEN SATURATION: 99 % | SYSTOLIC BLOOD PRESSURE: 160 MMHG | DIASTOLIC BLOOD PRESSURE: 72 MMHG | HEIGHT: 67 IN | WEIGHT: 162.04 LBS | RESPIRATION RATE: 14 BRPM

## 2021-10-14 DIAGNOSIS — Z96.659 PRESENCE OF UNSPECIFIED ARTIFICIAL KNEE JOINT: Chronic | ICD-10-CM

## 2021-10-14 DIAGNOSIS — Z86.79 PERSONAL HISTORY OF OTHER DISEASES OF THE CIRCULATORY SYSTEM: Chronic | ICD-10-CM

## 2021-10-14 DIAGNOSIS — K40.90 UNILATERAL INGUINAL HERNIA, WITHOUT OBSTRUCTION OR GANGRENE, NOT SPECIFIED AS RECURRENT: ICD-10-CM

## 2021-10-14 DIAGNOSIS — Z95.0 PRESENCE OF CARDIAC PACEMAKER: Chronic | ICD-10-CM

## 2021-10-14 DIAGNOSIS — Z98.49 CATARACT EXTRACTION STATUS, UNSPECIFIED EYE: Chronic | ICD-10-CM

## 2021-10-14 DIAGNOSIS — Z96.651 PRESENCE OF RIGHT ARTIFICIAL KNEE JOINT: Chronic | ICD-10-CM

## 2021-10-14 DIAGNOSIS — Z96.652 PRESENCE OF LEFT ARTIFICIAL KNEE JOINT: Chronic | ICD-10-CM

## 2021-10-14 DIAGNOSIS — Z98.890 OTHER SPECIFIED POSTPROCEDURAL STATES: Chronic | ICD-10-CM

## 2021-10-14 DIAGNOSIS — Z95.810 PRESENCE OF AUTOMATIC (IMPLANTABLE) CARDIAC DEFIBRILLATOR: Chronic | ICD-10-CM

## 2021-10-14 DIAGNOSIS — E11.9 TYPE 2 DIABETES MELLITUS WITHOUT COMPLICATIONS: Chronic | ICD-10-CM

## 2021-10-14 DIAGNOSIS — Z01.818 ENCOUNTER FOR OTHER PREPROCEDURAL EXAMINATION: ICD-10-CM

## 2021-10-14 LAB
ANION GAP SERPL CALC-SCNC: 8 MMOL/L — SIGNIFICANT CHANGE UP (ref 5–17)
BUN SERPL-MCNC: 22 MG/DL — SIGNIFICANT CHANGE UP (ref 7–23)
CALCIUM SERPL-MCNC: 9.4 MG/DL — SIGNIFICANT CHANGE UP (ref 8.4–10.5)
CHLORIDE SERPL-SCNC: 106 MMOL/L — SIGNIFICANT CHANGE UP (ref 96–108)
CO2 SERPL-SCNC: 26 MMOL/L — SIGNIFICANT CHANGE UP (ref 22–31)
CREAT SERPL-MCNC: 1.83 MG/DL — HIGH (ref 0.5–1.3)
GLUCOSE SERPL-MCNC: 293 MG/DL — HIGH (ref 70–99)
HCT VFR BLD CALC: 38.7 % — LOW (ref 39–50)
HGB BLD-MCNC: 13.8 G/DL — SIGNIFICANT CHANGE UP (ref 13–17)
MCHC RBC-ENTMCNC: 32.6 PG — SIGNIFICANT CHANGE UP (ref 27–34)
MCHC RBC-ENTMCNC: 35.7 GM/DL — SIGNIFICANT CHANGE UP (ref 32–36)
MCV RBC AUTO: 91.5 FL — SIGNIFICANT CHANGE UP (ref 80–100)
NRBC # BLD: 0 /100 WBCS — SIGNIFICANT CHANGE UP (ref 0–0)
PLATELET # BLD AUTO: 127 K/UL — LOW (ref 150–400)
POTASSIUM SERPL-MCNC: 4.5 MMOL/L — SIGNIFICANT CHANGE UP (ref 3.5–5.3)
POTASSIUM SERPL-SCNC: 4.5 MMOL/L — SIGNIFICANT CHANGE UP (ref 3.5–5.3)
RBC # BLD: 4.23 M/UL — SIGNIFICANT CHANGE UP (ref 4.2–5.8)
RBC # FLD: 13.2 % — SIGNIFICANT CHANGE UP (ref 10.3–14.5)
SODIUM SERPL-SCNC: 140 MMOL/L — SIGNIFICANT CHANGE UP (ref 135–145)
WBC # BLD: 5.88 K/UL — SIGNIFICANT CHANGE UP (ref 3.8–10.5)
WBC # FLD AUTO: 5.88 K/UL — SIGNIFICANT CHANGE UP (ref 3.8–10.5)

## 2021-10-14 PROCEDURE — 83036 HEMOGLOBIN GLYCOSYLATED A1C: CPT

## 2021-10-14 PROCEDURE — G0463: CPT

## 2021-10-14 PROCEDURE — 93005 ELECTROCARDIOGRAM TRACING: CPT

## 2021-10-14 PROCEDURE — 80048 BASIC METABOLIC PNL TOTAL CA: CPT

## 2021-10-14 PROCEDURE — 36415 COLL VENOUS BLD VENIPUNCTURE: CPT

## 2021-10-14 PROCEDURE — 85027 COMPLETE CBC AUTOMATED: CPT

## 2021-10-14 PROCEDURE — 93010 ELECTROCARDIOGRAM REPORT: CPT

## 2021-10-14 RX ORDER — ESZOPICLONE 2 MG/1
1 TABLET, COATED ORAL
Qty: 0 | Refills: 0 | DISCHARGE

## 2021-10-14 RX ORDER — UMECLIDINIUM BROMIDE AND VILANTEROL TRIFENATATE 62.5; 25 UG/1; UG/1
1 POWDER RESPIRATORY (INHALATION)
Qty: 0 | Refills: 0 | DISCHARGE

## 2021-10-14 RX ORDER — ERGOCALCIFEROL 1.25 MG/1
1 CAPSULE ORAL
Qty: 0 | Refills: 0 | DISCHARGE

## 2021-10-14 RX ORDER — AMLODIPINE AND VALSARTAN 5; 320 MG/1; MG/1
1 TABLET, FILM COATED ORAL
Qty: 0 | Refills: 0 | DISCHARGE

## 2021-10-14 RX ORDER — PREGABALIN 225 MG/1
1 CAPSULE ORAL
Qty: 0 | Refills: 0 | DISCHARGE

## 2021-10-14 RX ORDER — SEMAGLUTIDE 0.68 MG/ML
0 INJECTION, SOLUTION SUBCUTANEOUS
Qty: 0 | Refills: 0 | DISCHARGE

## 2021-10-14 RX ORDER — GLIMEPIRIDE 1 MG
1 TABLET ORAL
Qty: 0 | Refills: 0 | DISCHARGE

## 2021-10-14 RX ORDER — TAMSULOSIN HYDROCHLORIDE 0.4 MG/1
1 CAPSULE ORAL
Qty: 0 | Refills: 0 | DISCHARGE

## 2021-10-14 RX ORDER — ASPIRIN/CALCIUM CARB/MAGNESIUM 324 MG
0 TABLET ORAL
Qty: 0 | Refills: 0 | DISCHARGE

## 2021-10-14 RX ORDER — LISINOPRIL 2.5 MG/1
1 TABLET ORAL
Qty: 0 | Refills: 0 | DISCHARGE

## 2021-10-14 RX ORDER — PIOGLITAZONE HYDROCHLORIDE 15 MG/1
1 TABLET ORAL
Qty: 0 | Refills: 0 | DISCHARGE

## 2021-10-14 NOTE — H&P PST ADULT - NSICDXPASTSURGICALHX_GEN_ALL_CORE_FT
PAST SURGICAL HISTORY:  AICD (automatic cardioverter/defibrillator) present 2009    History of cryosurgery prostate cancer 2011    History of prostate surgery 2017 sec to BPH not sure    Pacemaker PPM 2003 changed to AICD 2009    S/P cataract surgery bilateral 2018    S/P total knee replacement, left 1998, revision 2015    S/P total knee replacement, right 2015     PAST SURGICAL HISTORY:  AICD (automatic cardioverter/defibrillator) present 2009    History of cryosurgery prostate cancer 2011    History of prostate surgery 2017 TURP ? for BPH    Pacemaker PPM 2003 changed to AICD 2009    S/P cataract surgery bilateral 2018    S/P total knee replacement, left 1998, revision 2015    S/P total knee replacement, right 2015

## 2021-10-14 NOTE — H&P PST ADULT - PROBLEM SELECTOR PLAN 1
scheduled for right inguinal hernia repair on 10/25/21  Medical , cardiac and pulmonary clearance   AICD interrogation  pre op instructions  Abnormal EKG ; Referred to cardiologist   Cardiologist needs to address EKG in the clearance   COVID test at [facility]   POCT Bs on admit

## 2021-10-14 NOTE — H&P PST ADULT - NSICDXPASTMEDICALHX_GEN_ALL_CORE_FT
PAST MEDICAL HISTORY:  BPH with urinary obstruction     COPD, mild     COVID-19 vaccine series completed Pfizer 2/2021    History of complete heart block PPM 2003    History of right inguinal hernia     HTN (hypertension)     Lumbar disc herniation EDUARDO x 2 2021    PETRA on CPAP     Prostate cancer 2011 s/p cryosurgery    Type 2 diabetes mellitus     Vitamin D deficiency

## 2021-10-14 NOTE — H&P PST ADULT - NSICDXFAMILYHX_GEN_ALL_CORE_FT
FAMILY HISTORY:  Father  Still living? No  FH: congestive heart failure, Age at diagnosis: Age Unknown    Sibling  Still living? No  Family history- stomach cancer, Age at diagnosis: Age Unknown

## 2021-10-14 NOTE — H&P PST ADULT - HISTORY OF PRESENT ILLNESS
This is 82 y/o male with  complaint of severe right groin pain 2 weeks ago, went to PCP dx with right inguinal hernia , scheduled for right inguinal hernia repair on 10/25/21

## 2021-10-15 LAB
A1C WITH ESTIMATED AVERAGE GLUCOSE RESULT: 8.2 % — HIGH (ref 4–5.6)
ESTIMATED AVERAGE GLUCOSE: 189 MG/DL — HIGH (ref 68–114)

## 2021-10-19 PROBLEM — E11.9 TYPE 2 DIABETES MELLITUS WITHOUT COMPLICATIONS: Chronic | Status: ACTIVE | Noted: 2021-10-14

## 2021-10-19 PROBLEM — G47.33 OBSTRUCTIVE SLEEP APNEA (ADULT) (PEDIATRIC): Chronic | Status: ACTIVE | Noted: 2021-10-14

## 2021-10-19 PROBLEM — N40.1 BENIGN PROSTATIC HYPERPLASIA WITH LOWER URINARY TRACT SYMPTOMS: Chronic | Status: ACTIVE | Noted: 2021-10-14

## 2021-10-19 PROBLEM — E55.9 VITAMIN D DEFICIENCY, UNSPECIFIED: Chronic | Status: ACTIVE | Noted: 2021-10-14

## 2021-10-19 PROBLEM — Z87.19 PERSONAL HISTORY OF OTHER DISEASES OF THE DIGESTIVE SYSTEM: Chronic | Status: ACTIVE | Noted: 2021-10-14

## 2021-10-19 PROBLEM — Z86.79 PERSONAL HISTORY OF OTHER DISEASES OF THE CIRCULATORY SYSTEM: Chronic | Status: ACTIVE | Noted: 2021-10-14

## 2021-10-19 PROBLEM — I10 ESSENTIAL (PRIMARY) HYPERTENSION: Chronic | Status: ACTIVE | Noted: 2021-10-14

## 2021-10-19 PROBLEM — C61 MALIGNANT NEOPLASM OF PROSTATE: Chronic | Status: ACTIVE | Noted: 2021-10-14

## 2021-10-19 PROBLEM — J44.9 CHRONIC OBSTRUCTIVE PULMONARY DISEASE, UNSPECIFIED: Chronic | Status: ACTIVE | Noted: 2021-10-14

## 2021-10-19 PROBLEM — M51.26 OTHER INTERVERTEBRAL DISC DISPLACEMENT, LUMBAR REGION: Chronic | Status: ACTIVE | Noted: 2021-10-14

## 2021-10-19 PROBLEM — Z92.29 PERSONAL HISTORY OF OTHER DRUG THERAPY: Chronic | Status: ACTIVE | Noted: 2021-10-14

## 2021-10-21 DIAGNOSIS — Z01.818 ENCOUNTER FOR OTHER PREPROCEDURAL EXAMINATION: ICD-10-CM

## 2021-10-21 PROBLEM — Z00.00 ENCOUNTER FOR PREVENTIVE HEALTH EXAMINATION: Status: ACTIVE | Noted: 2021-10-21

## 2021-10-22 ENCOUNTER — APPOINTMENT (OUTPATIENT)
Dept: DISASTER EMERGENCY | Facility: CLINIC | Age: 81
End: 2021-10-22

## 2023-10-06 NOTE — PATIENT INSTRUCTIONS
[facility]          Patient  EP Instructions                  1. You are scheduled to have a AICD generator change on  2/13/18 , at 8 am.    Please check in at 7 am.    2. Please go to [facility] and park in the outpatient parking lot that is located around to the back of the hospital and enter through the [facility] building. Once you enter through the [facility] check in with the  there. The  will either give you directions or assist you in getting to the cath holding area. 3.  [x]       You are not to eat or drink anything after midnight the morning of the procedure. 4. Please continue to take your medications with a small sip of water on the morning of the procedure with the following exceptions:  N/A     5. If you are diabetic, do not take your insulin/sugar pill the morning of the procedure. 6. We encourage families to wait in the waiting room on the first floor while the procedure is being done. The Doctor will come out and talk with you as soon as the procedure is over. 7. There is the possibility that you may spend the night in the hospital, depending on the results of the procedure. This will be determined after the procedure is done. 8.   If you or your family have any questions, please call our office Monday-Friday 9:00am         -4:30 pm , at [phone], and ask to speak to one of the nurses. Render In Strict Bullet Format?: Yes Initiate Treatment: Opzelura 1.5 % topical cream \\nTwice a day to areas of vitiligo (nonsegmental, less than 10% BSA) Detail Level: Zone